# Patient Record
Sex: FEMALE | Race: ASIAN | NOT HISPANIC OR LATINO | Employment: PART TIME | URBAN - METROPOLITAN AREA
[De-identification: names, ages, dates, MRNs, and addresses within clinical notes are randomized per-mention and may not be internally consistent; named-entity substitution may affect disease eponyms.]

---

## 2017-03-03 ENCOUNTER — TELEPHONE (OUTPATIENT)
Dept: FAMILY MEDICINE | Facility: CLINIC | Age: 37
End: 2017-03-03

## 2017-03-03 DIAGNOSIS — E55.9 VITAMIN D DEFICIENCY: ICD-10-CM

## 2017-03-03 RX ORDER — ERGOCALCIFEROL 1.25 MG/1
50000 CAPSULE, LIQUID FILLED ORAL
Qty: 8 CAPSULE | Refills: 0 | OUTPATIENT
Start: 2017-03-03

## 2017-03-03 NOTE — TELEPHONE ENCOUNTER
cholecalciferol (VITAMIN D3) 16826 UNITS capsule      Last Written Prescription Date: 12/22/16  Last Fill Quantity: 8,  # refills: 0   Last Office Visit with [facility], [facility] or [facility] prescribing provider: 12/14/16 Ginny Helm PA-C, [facility]

## 2017-03-03 NOTE — TELEPHONE ENCOUNTER
Should discontinue 42857 units once a week and take 2000 units daily as prescribed.  Recheck vitamin D level in 3 months

## 2017-03-03 NOTE — TELEPHONE ENCOUNTER
Routing refill request to provider for review/approval because:  Drug not on the [facility] refill protocol   Danitza Oro RN

## 2017-03-22 DIAGNOSIS — E55.9 VITAMIN D DEFICIENCY: ICD-10-CM

## 2017-03-22 RX ORDER — CHOLECALCIFEROL (VITAMIN D3) 50 MCG
2000 TABLET ORAL DAILY
Qty: 100 TABLET | Refills: 3 | Status: CANCELLED | OUTPATIENT
Start: 2017-03-22

## 2017-03-22 NOTE — TELEPHONE ENCOUNTER
Cholecalciferol (VITAMIN D) 2000 UNITS tablet      Last Written Prescription Date: 12/18/16  Last Fill Quantity: 100 tablets,  # refills: 3   Last Office Visit with [facility], [facility] or [facility] prescribing provider: 12/14/16  Ginny Patino

## 2017-03-27 DIAGNOSIS — E55.9 VITAMIN D DEFICIENCY: ICD-10-CM

## 2017-03-27 RX ORDER — CHOLECALCIFEROL (VITAMIN D3) 50 MCG
2000 TABLET ORAL DAILY
Qty: 100 TABLET | Refills: 3 | Status: CANCELLED | OUTPATIENT
Start: 2017-03-27

## 2017-03-27 NOTE — TELEPHONE ENCOUNTER
Cholecalciferol (VITAMIN D) 2000 UNITS tablet    Last Written Prescription Date: 12/18/16  Last Fill Quantity: 100 tablets, # refills: 3  Last Office Visit with G, P or [facility] prescribing provider: 12/14/16 Ginny Helm         DEXA Scan:  No order of DX HIP/PELVIS/SPINE is found.     No order of DX HIP/PELVIS/SPINE W LAT FRACTION ANALYSIS is found.       No results found for: HELENA Patino

## 2017-03-31 NOTE — TELEPHONE ENCOUNTER
The following prescription was sent to [place], [place]    Cholecalciferol (VITAMIN D) 2000 UNITS tablet 100 tablet 3 12/18/2016  --   Sig: Take 2,000 Units by mouth daily   Class: E-Prescribe   Route: Oral   Order: [id number]   E-Prescribing Status: Receipt confirmed by pharmacy (12/18/2016 11:09 AM CST)     Request denied.  Too soon to fill. Patient should have refills available.    Danitza Oro RN

## 2017-09-06 ENCOUNTER — OFFICE VISIT (OUTPATIENT)
Dept: FAMILY MEDICINE | Facility: CLINIC | Age: 37
End: 2017-09-06
Payer: COMMERCIAL

## 2017-09-06 VITALS
HEART RATE: 68 BPM | BODY MASS INDEX: 24.55 KG/M2 | SYSTOLIC BLOOD PRESSURE: 98 MMHG | RESPIRATION RATE: 16 BRPM | TEMPERATURE: 98.4 F | OXYGEN SATURATION: 100 % | DIASTOLIC BLOOD PRESSURE: 60 MMHG | HEIGHT: 61 IN | WEIGHT: 130 LBS

## 2017-09-06 DIAGNOSIS — E55.9 VITAMIN D DEFICIENCY: ICD-10-CM

## 2017-09-06 DIAGNOSIS — D50.9 IRON DEFICIENCY ANEMIA, UNSPECIFIED IRON DEFICIENCY ANEMIA TYPE: ICD-10-CM

## 2017-09-06 DIAGNOSIS — E03.9 ACQUIRED HYPOTHYROIDISM: ICD-10-CM

## 2017-09-06 DIAGNOSIS — Z12.4 SCREENING FOR MALIGNANT NEOPLASM OF CERVIX: ICD-10-CM

## 2017-09-06 DIAGNOSIS — R53.83 FATIGUE, UNSPECIFIED TYPE: Primary | ICD-10-CM

## 2017-09-06 DIAGNOSIS — Z23 NEED FOR PROPHYLACTIC VACCINATION AND INOCULATION AGAINST INFLUENZA: ICD-10-CM

## 2017-09-06 LAB
BASOPHILS # BLD AUTO: 0 10E9/L (ref 0–0.2)
BASOPHILS NFR BLD AUTO: 0.2 %
DIFFERENTIAL METHOD BLD: ABNORMAL
EOSINOPHIL # BLD AUTO: 0.8 10E9/L (ref 0–0.7)
EOSINOPHIL NFR BLD AUTO: 9.5 %
ERYTHROCYTE [DISTWIDTH] IN BLOOD BY AUTOMATED COUNT: 12.9 % (ref 10–15)
HCT VFR BLD AUTO: 34.9 % (ref 35–47)
HGB BLD-MCNC: 11.4 G/DL (ref 11.7–15.7)
LYMPHOCYTES # BLD AUTO: 3.3 10E9/L (ref 0.8–5.3)
LYMPHOCYTES NFR BLD AUTO: 39.6 %
MCH RBC QN AUTO: 28.9 PG (ref 26.5–33)
MCHC RBC AUTO-ENTMCNC: 32.7 G/DL (ref 31.5–36.5)
MCV RBC AUTO: 88 FL (ref 78–100)
MONOCYTES # BLD AUTO: 0.6 10E9/L (ref 0–1.3)
MONOCYTES NFR BLD AUTO: 6.8 %
NEUTROPHILS # BLD AUTO: 3.7 10E9/L (ref 1.6–8.3)
NEUTROPHILS NFR BLD AUTO: 43.9 %
PLATELET # BLD AUTO: 281 10E9/L (ref 150–450)
RBC # BLD AUTO: 3.95 10E12/L (ref 3.8–5.2)
WBC # BLD AUTO: 8.4 10E9/L (ref 4–11)

## 2017-09-06 PROCEDURE — 85025 COMPLETE CBC W/AUTO DIFF WBC: CPT | Performed by: PHYSICIAN ASSISTANT

## 2017-09-06 PROCEDURE — 99214 OFFICE O/P EST MOD 30 MIN: CPT | Mod: 25 | Performed by: PHYSICIAN ASSISTANT

## 2017-09-06 PROCEDURE — 87624 HPV HI-RISK TYP POOLED RSLT: CPT | Performed by: PHYSICIAN ASSISTANT

## 2017-09-06 PROCEDURE — 82728 ASSAY OF FERRITIN: CPT | Performed by: PHYSICIAN ASSISTANT

## 2017-09-06 PROCEDURE — G0145 SCR C/V CYTO,THINLAYER,RESCR: HCPCS | Performed by: PHYSICIAN ASSISTANT

## 2017-09-06 PROCEDURE — 84443 ASSAY THYROID STIM HORMONE: CPT | Performed by: PHYSICIAN ASSISTANT

## 2017-09-06 PROCEDURE — 36415 COLL VENOUS BLD VENIPUNCTURE: CPT | Performed by: PHYSICIAN ASSISTANT

## 2017-09-06 PROCEDURE — 90686 IIV4 VACC NO PRSV 0.5 ML IM: CPT | Performed by: PHYSICIAN ASSISTANT

## 2017-09-06 PROCEDURE — 82306 VITAMIN D 25 HYDROXY: CPT | Performed by: PHYSICIAN ASSISTANT

## 2017-09-06 PROCEDURE — 90471 IMMUNIZATION ADMIN: CPT | Performed by: PHYSICIAN ASSISTANT

## 2017-09-06 RX ORDER — LEVOTHYROXINE SODIUM 100 UG/1
100 TABLET ORAL DAILY
Qty: 90 TABLET | Refills: 3 | Status: SHIPPED | OUTPATIENT
Start: 2017-09-06 | End: 2018-09-19

## 2017-09-06 ASSESSMENT — PAIN SCALES - GENERAL: PAINLEVEL: NO PAIN (0)

## 2017-09-06 NOTE — MR AVS SNAPSHOT
After Visit Summary   9/6/2017    Chelsey Bolanos    MRN: [id number]           Patient Information     Date Of Birth          1980        Visit Information        Provider Department      9/6/2017 6:00 PM Ginny Helm PA-C [facility]        Today's Diagnoses     Screening for malignant neoplasm of cervix    -  1    Acquired hypothyroidism        Fatigue, unspecified type        Iron deficiency anemia, unspecified iron deficiency anemia type        Vitamin D deficiency          Care Instructions    We will notify you of lab results via [employer] [facility]  Follow up with us if dry skin does not improve.   I have sent over refill of your thyroid medication for one year - I will make adjustments in medications if needed.           Follow-ups after your visit        Who to contact     If you have questions or need follow up information about today's clinic visit or your schedule please contact [facility] directly at [phone].  Normal or non-critical lab and imaging results will be communicated to you by MyChart, letter or phone within 4 business days after the clinic has received the results. If you do not hear from us within 7 days, please contact the clinic through [facility] or phone. If you have a critical or abnormal lab result, we will notify you by phone as soon as possible.  Submit refill requests through [employer] or call your pharmacy and they will forward the refill request to us. Please allow 3 business days for your refill to be completed.          Additional Information About Your Visit        MyChart Information     [facility] gives you secure access to your electronic health record. If you see a primary care provider, you can also send messages to your care team and make appointments. If you have questions, please call your primary care clinic.  If you do not have a primary care provider, please call [phone] and they will assist you.        Care EveryWhere  "ID     This is your Care EveryWhere ID. This could be used by other organizations to access your [place] medical records  [id number]        Your Vitals Were     Pulse Temperature Respirations Height Last Period Pulse Oximetry    68 98.4  F (36.9  C) (Oral) 16 1.556 m (5' 1.25\") 08/10/2017 (Exact Date) 100%    Breastfeeding? BMI (Body Mass Index)                No 24.36 kg/m2           Blood Pressure from Last 3 Encounters:   09/06/17 98/60   12/14/16 98/80   03/18/15 101/68    Weight from Last 3 Encounters:   09/06/17 59 kg (130 lb)   12/14/16 56.9 kg (125 lb 6.4 oz)   03/18/15 62.1 kg (137 lb)              We Performed the Following     25 Hydroxyvitamin D2 and D3     CBC with platelets differential     Ferritin     HPV High Risk Types DNA Cervical     Pap imaged thin layer screen with HPV - recommended age 30 - 65 years (select HPV order below)     TSH with free T4 reflex          Where to get your medicines      These medications were sent to [id number] - [place], [place] - [address]  [address], [facility] [address]     Phone:  [phone]     levothyroxine 100 MCG tablet          Primary Care Provider Office Phone # Fax #    Ginny Helm PA-C [phone] [address]  [facility] [address]        Equal Access to Services     BHARAT DEL CID : Hadii adarsh ku hadasho Soomaali, waaxda luqadaha, qaybta kaalmada adeegyada, caren perez. So [facility] [phone].    ATENCIÓN: Si habla español, tiene a allred disposición servicios gratuitos de asistencia lingüística. Llame al [phone].    We comply with applicable federal civil rights laws and [place] laws. We do not discriminate on the basis of race, color, national origin, age, disability sex, sexual orientation or gender identity.            Thank you!     Thank you for choosing [facility]  for your care. Our goal is always to provide you with excellent " care. Hearing back from our patients is one way we can continue to improve our services. Please take a few minutes to complete the written survey that you may receive in the mail after your visit with us. Thank you!             Your Updated Medication List - Protect others around you: Learn how to safely use, store and throw away your medicines at www.disposemymeds.org.          This list is accurate as of: 9/6/17  6:34 PM.  Always use your most recent med list.                   Brand Name Dispense Instructions for use Diagnosis    ferrous gluconate 324 (38 FE) MG tablet    FERGON    180 tablet    Take 1 tablet (324 mg) by mouth 2 times daily    Other iron deficiency anemia       levothyroxine 100 MCG tablet    SYNTHROID/LEVOTHROID    90 tablet    Take 1 tablet (100 mcg) by mouth daily    Acquired hypothyroidism       vitamin D 2000 UNITS tablet     100 tablet    Take 2,000 Units by mouth daily    Vitamin D deficiency

## 2017-09-06 NOTE — PROGRESS NOTES
SUBJECTIVE:   Chelsey Bolanos is a 36 year old female who presents to clinic today for the following health issues:      Hypothyroidism Follow-up      Since last visit, patient describes the following symptoms: fatigue, skin changes    Amount of exercise or physical activity: 5 days a week    Problems taking medications regularly: No    Medication side effects: none    Diet: regular (no restrictions)      Complains of fatigue.  Feels like has to nap or sleep every day.  Sleeps well.  No chest pain or shortness of breath.  No weight loss.   Wt Readings from Last 4 Encounters:   09/06/17 59 kg (130 lb)   12/14/16 56.9 kg (125 lb 6.4 oz)   03/18/15 62.1 kg (137 lb)   11/24/14 58.1 kg (128 lb)         Area of dry skin dorsum of right hand.  If out of the sun seems to blister up and become drier.  No redness.       Problem list and histories reviewed & adjusted, as indicated.  Additional history: as documented    Patient Active Problem List   Diagnosis     CARDIOVASCULAR SCREENING; LDL GOAL LESS THAN 160     Hypothyroidism     Anemia     Acquired hypothyroidism     Iron deficiency anemia, unspecified iron deficiency anemia type     Vitamin D deficiency     Past Surgical History:   Procedure Laterality Date     GYN SURGERY      C section x 2       Social History   Substance Use Topics     Smoking status: Never Smoker     Smokeless tobacco: Never Used     Alcohol use No     Family History   Problem Relation Age of Onset     Breast Cancer Mother 50     DIABETES Mother      C.A.D. Maternal Grandmother      Coronary Artery Disease Other 54     MI and stent placement     Colon Cancer No family hx of          Current Outpatient Prescriptions   Medication Sig Dispense Refill     levothyroxine (SYNTHROID/LEVOTHROID) 100 MCG tablet Take 1 tablet (100 mcg) by mouth daily 90 tablet 3     Cholecalciferol (VITAMIN D) 2000 UNITS tablet Take 2,000 Units by mouth daily 100 tablet 3     ferrous gluconate (FERGON) 324 (38 FE) MG tablet  "Take 1 tablet (324 mg) by mouth 2 times daily 180 tablet 3         Reviewed and updated as needed this visit by clinical staffTobacco  Allergies  Meds  Med Hx  Surg Hx  Fam Hx  Soc Hx      Reviewed and updated as needed this visit by Provider         ROS:  Constitutional, HEENT, cardiovascular, pulmonary, gi and gu systems are negative, except as otherwise noted.      OBJECTIVE:   BP 98/60 (BP Location: Right arm, Patient Position: Chair, Cuff Size: Adult Regular)  Pulse 68  Temp 98.4  F (36.9  C) (Oral)  Resp 16  Ht 1.556 m (5' 1.25\")  Wt 59 kg (130 lb)  LMP 08/10/2017 (Exact Date)  SpO2 100%  Breastfeeding? No  BMI 24.36 kg/m2  Body mass index is 24.36 kg/(m^2).  GENERAL: healthy, alert and no distress  NECK: no adenopathy, no asymmetry, masses, or scars and thyroid normal to palpation  RESP: lungs clear to auscultation - no rales, rhonchi or wheezes  CV: regular rate and rhythm, normal S1 S2, no S3 or S4, no murmur, click or rub, no peripheral edema and peripheral pulses strong  ABDOMEN: soft, nontender, no hepatosplenomegaly, no masses and bowel sounds normal   (female): normal female external genitalia, normal urethral meatus, vaginal mucosa, normal cervix/adnexa/uterus without masses or discharge  MS: no gross musculoskeletal defects noted, no edema  SKIN: no suspicious lesions or rashes    Diagnostic Test Results:  Results for orders placed or performed in visit on 09/06/17   CBC with platelets differential   Result Value Ref Range    WBC 8.4 4.0 - 11.0 10e9/L    RBC Count 3.95 3.8 - 5.2 10e12/L    Hemoglobin 11.4 (L) 11.7 - 15.7 g/dL    Hematocrit 34.9 (L) 35.0 - 47.0 %    MCV 88 78 - 100 fl    MCH 28.9 26.5 - 33.0 pg    MCHC 32.7 31.5 - 36.5 g/dL    RDW 12.9 10.0 - 15.0 %    Platelet Count 281 150 - 450 10e9/L    Diff Method Automated Method     % Neutrophils 43.9 %    % Lymphocytes 39.6 %    % Monocytes 6.8 %    % Eosinophils 9.5 %    % Basophils 0.2 %    Absolute Neutrophil 3.7 1.6 - 8.3 " 10e9/L    Absolute Lymphocytes 3.3 0.8 - 5.3 10e9/L    Absolute Monocytes 0.6 0.0 - 1.3 10e9/L    Absolute Eosinophils 0.8 (H) 0.0 - 0.7 10e9/L    Absolute Basophils 0.0 0.0 - 0.2 10e9/L       ASSESSMENT/PLAN:             1. Fatigue, unspecified type  Patient did not wait for labs.  hgb is low. Will wait for ferritin to come back before recommendations.  Has been iron deficient and low vitamin D in past.  Also history of hypothyroidism- will wait for tsh   - TSH with free T4 reflex  - CBC with platelets differential  - Ferritin    2. Acquired hypothyroidism  Will recheck TSH given fatigue  - levothyroxine (SYNTHROID/LEVOTHROID) 100 MCG tablet; Take 1 tablet (100 mcg) by mouth daily  Dispense: 90 tablet; Refill: 3  - TSH with free T4 reflex    3. Screening for malignant neoplasm of cervix    - Pap imaged thin layer screen with HPV - recommended age 30 - 65 years (select HPV order below)  - HPV High Risk Types DNA Cervical    4. Iron deficiency anemia, unspecified iron deficiency anemia type    - Ferritin    5. Vitamin D deficiency  Is taking vitamin D   - 25 Hydroxyvitamin D2 and D3    6. Need for prophylactic vaccination and inoculation against influenza    - FLU VAC, SPLIT VIRUS IM > 3 YO (QUADRIVALENT) [50587]  - Vaccine Administration, Initial [62415]    Patient Instructions   We will notify you of lab results via MyChart  Follow up with us if dry skin does not improve.   I have sent over refill of your thyroid medication for one year - I will make adjustments in medications if needed.        Ginny Helm PA-C  [facility]        Injectable Influenza Immunization Documentation    1.  Is the person to be vaccinated sick today?  No    2. Does the person to be vaccinated have an allergy to eggs or to a component of the vaccine?  No    3. Has the person to be vaccinated today ever had a serious reaction to influenza vaccine in the past?  No    4. Has the person to be vaccinated ever had  Guillain-[place] syndrome?  No     Form completed by   Gertrudis Chavez MA

## 2017-09-06 NOTE — PATIENT INSTRUCTIONS
We will notify you of lab results via [facility]  Follow up with us if dry skin does not improve.   I have sent over refill of your thyroid medication for one year - I will make adjustments in medications if needed.

## 2017-09-06 NOTE — NURSING NOTE
"Chief Complaint   Patient presents with     Thyroid Disease       Initial BP 98/60 (BP Location: Right arm, Patient Position: Chair, Cuff Size: Adult Regular)  Pulse 68  Temp 98.4  F (36.9  C) (Oral)  Resp 16  Ht 1.556 m (5' 1.25\")  Wt 59 kg (130 lb)  LMP 08/10/2017 (Exact Date)  SpO2 100%  Breastfeeding? No  BMI 24.36 kg/m2 Estimated body mass index is 24.36 kg/(m^2) as calculated from the following:    Height as of this encounter: 1.556 m (5' 1.25\").    Weight as of this encounter: 59 kg (130 lb).  Medication Reconciliation: complete     Gertrudis Chavez MA       "

## 2017-09-07 ENCOUNTER — TELEPHONE (OUTPATIENT)
Dept: FAMILY MEDICINE | Facility: CLINIC | Age: 37
End: 2017-09-07

## 2017-09-07 LAB
FERRITIN SERPL-MCNC: 20 NG/ML (ref 12–150)
TSH SERPL DL<=0.005 MIU/L-ACNC: 1.05 MU/L (ref 0.4–4)

## 2017-09-07 NOTE — TELEPHONE ENCOUNTER
Notes Recorded by Ginny Helm PA-C on 9/7/2017 at 1:47 PM  Please call and advise - didn't have [facility] access yesterday  Ferritin levels were normal.   Thyroid function was normal   hgb was low and could explain her fatigue- is she taking iron regularly? If not restart and recheck hgb in one month.  If she is taking iron regularly route back to me so we can add some additional labs    Called patient and advised of above. Not taking iron supplement regularly as she keeps forgetting. Patient aware to restart iron and to recheck Hgb in a month. Will call to schedule lab appointment.\  Danitza Tran RN.

## 2017-09-07 NOTE — PROGRESS NOTES
Please call and advise - didn't have [employer] access yesterday  Ferritin levels were normal.   Thyroid function was normal   hgb was low and could explain her fatigue- is she taking iron regularly? If not restart and recheck hgb in one month.  If she is taking iron regularly route back to me so we can add some additional labs

## 2017-09-08 ENCOUNTER — TELEPHONE (OUTPATIENT)
Dept: FAMILY MEDICINE | Facility: CLINIC | Age: 37
End: 2017-09-08

## 2017-09-08 DIAGNOSIS — D50.9 IRON DEFICIENCY ANEMIA, UNSPECIFIED IRON DEFICIENCY ANEMIA TYPE: Primary | ICD-10-CM

## 2017-09-08 DIAGNOSIS — E55.9 VITAMIN D DEFICIENCY: ICD-10-CM

## 2017-09-08 LAB
COPATH REPORT: NORMAL
DEPRECATED CALCIDIOL+CALCIFEROL SERPL-MC: <29 UG/L (ref 20–75)
PAP: NORMAL
VITAMIN D2 SERPL-MCNC: <5 UG/L
VITAMIN D3 SERPL-MCNC: 24 UG/L

## 2017-09-08 RX ORDER — CHOLECALCIFEROL (VITAMIN D3) 50 MCG
2000 TABLET ORAL DAILY
Qty: 100 TABLET | Refills: 3 | Status: SHIPPED | OUTPATIENT
Start: 2017-09-08 | End: 2017-10-12

## 2017-09-08 NOTE — TELEPHONE ENCOUNTER
Vitamin D 2,000 IU sent to [email or web address]. Called and advised patient of below and that RX is at [email or web address].  Danitza Tran RN.     Patient will call back to schedule both lab appointment.  Danitza Tran RN.

## 2017-09-08 NOTE — TELEPHONE ENCOUNTER
Continue 2000 units daily.    Repeat labs for iron deficiency anemia in one month.  Labs for vitamin D deficiency in 3 months

## 2017-09-08 NOTE — TELEPHONE ENCOUNTER
Notes Recorded by Radha Montana, LIANA on 9/8/2017 at 1:32 PM  Please call: labs show anemia: NP sent in script for iron 325 mg daily-start today. Recommend return to clinic for a visit on Monday/Tuesday next week to see how he is feeling and repeat labs. Call if develop fever/chills or worsening SOB.   BELÉN Grossman, NP-C    Patient was informed of test results and recommendations from provider. Patient verbalized understanding of all information given. Patient has not been taking her Vitamin D. Was sent to [facility] in Dec. Will notify PCP if wants same dose of Vitamin D as in Dec.  Routing to provider to advise on dose.  Danitza Tran RN.

## 2017-09-08 NOTE — PROGRESS NOTES
Please call and advise - did not have [facility] access the other day  Vitamin D level was low.  Please make sure taking vitamin D daily and recheck vitamin D level in 3 months.

## 2017-09-11 LAB
FINAL DIAGNOSIS: NORMAL
HPV HR 12 DNA CVX QL NAA+PROBE: NEGATIVE
HPV16 DNA SPEC QL NAA+PROBE: NEGATIVE
HPV18 DNA SPEC QL NAA+PROBE: NEGATIVE
SPECIMEN DESCRIPTION: NORMAL

## 2017-10-12 DIAGNOSIS — E55.9 VITAMIN D DEFICIENCY: ICD-10-CM

## 2017-10-12 RX ORDER — CHOLECALCIFEROL (VITAMIN D3) 50 MCG
2000 TABLET ORAL DAILY
Qty: 100 TABLET | Refills: 3 | Status: SHIPPED | OUTPATIENT
Start: 2017-10-12

## 2017-10-12 NOTE — TELEPHONE ENCOUNTER
Reason for Call:  Medication or medication refill:    Do you use a [facility]?  Name of the pharmacy and phone number for the current request: [facility]    Name of the medication requested: Vit D 2000 mg wants kind of tab without gelatin in it     Other request: please call and advise    Can we leave a detailed message on this number? YES    Phone number patient can be reached at: Home number on file [phone] (home)    Best Time: any    Call taken on 10/12/2017 at 12:24 PM by Nazanin Richardson

## 2017-10-12 NOTE — TELEPHONE ENCOUNTER
Cholecalciferol (VITAMIN D) 2000 UNITS tablet      Last Written Prescription Date: 9/8/17  Last Fill Quantity: 100,  # refills: 3   Last Office Visit with [facility], [facility] or [facility] prescribing provider: 9/6/17

## 2018-03-08 ENCOUNTER — TELEPHONE (OUTPATIENT)
Dept: FAMILY MEDICINE | Facility: CLINIC | Age: 38
End: 2018-03-08

## 2018-03-08 NOTE — TELEPHONE ENCOUNTER
PA for Levothyroxine Sodium    Www[employer]covermymeds.[employer]    Key: [id number]    Patients last name    Patients ESTUARDO Patino

## 2018-03-14 NOTE — TELEPHONE ENCOUNTER
PA NOT NEEDED INSURANCE STATES THAT IF PATIENT NEEDS A 90 DAY SUPPLY THEY NEED TO GO TO A MAIL ORDER PHARMACY. I TALK TO THE PHARMACIST AT [facility]. THEY PROCESSED IT FOR A 30 DAY SUPPLY WITH A  $9.15 COPAY.

## 2018-03-14 NOTE — TELEPHONE ENCOUNTER
Central Prior Authorization Team   Phone: [phone]      PA Initiation    Medication: PA for Levothyroxine Sodium  Insurance Company: [employer] - Phone [phone] Fax [phone]  Pharmacy Filling the Rx: [id number] - [place], [place] - [address]  Filling Pharmacy Phone: [phone]  Filling Pharmacy Fax: [phone]  Start Date: 3/14/2018

## 2018-09-18 ENCOUNTER — DOCUMENTATION ONLY (OUTPATIENT)
Dept: LAB | Facility: CLINIC | Age: 38
End: 2018-09-18

## 2018-09-18 DIAGNOSIS — Z00.00 ROUTINE GENERAL MEDICAL EXAMINATION AT A HEALTH CARE FACILITY: ICD-10-CM

## 2018-09-18 DIAGNOSIS — D50.9 IRON DEFICIENCY ANEMIA, UNSPECIFIED IRON DEFICIENCY ANEMIA TYPE: ICD-10-CM

## 2018-09-18 DIAGNOSIS — E03.9 ACQUIRED HYPOTHYROIDISM: ICD-10-CM

## 2018-09-18 DIAGNOSIS — E55.9 VITAMIN D DEFICIENCY: ICD-10-CM

## 2018-09-18 DIAGNOSIS — Z00.00 ROUTINE GENERAL MEDICAL EXAMINATION AT A HEALTH CARE FACILITY: Primary | ICD-10-CM

## 2018-09-18 PROCEDURE — 82728 ASSAY OF FERRITIN: CPT | Performed by: FAMILY MEDICINE

## 2018-09-18 PROCEDURE — 82306 VITAMIN D 25 HYDROXY: CPT | Performed by: FAMILY MEDICINE

## 2018-09-18 PROCEDURE — 36415 COLL VENOUS BLD VENIPUNCTURE: CPT | Performed by: FAMILY MEDICINE

## 2018-09-18 PROCEDURE — 80053 COMPREHEN METABOLIC PANEL: CPT | Performed by: FAMILY MEDICINE

## 2018-09-18 PROCEDURE — 85027 COMPLETE CBC AUTOMATED: CPT | Performed by: FAMILY MEDICINE

## 2018-09-18 NOTE — PROGRESS NOTES
Patient is coming in for pre visit labs. The only orders in her chart are from mikael and seem old, so I thought to check and make sure those the the ones needed? If not needed, please place correct orders. Thanks.

## 2018-09-19 ENCOUNTER — TELEPHONE (OUTPATIENT)
Dept: FAMILY MEDICINE | Facility: CLINIC | Age: 38
End: 2018-09-19

## 2018-09-19 DIAGNOSIS — E03.9 ACQUIRED HYPOTHYROIDISM: ICD-10-CM

## 2018-09-19 NOTE — TELEPHONE ENCOUNTER
"Requested Prescriptions   Pending Prescriptions Disp Refills     levothyroxine (SYNTHROID/LEVOTHROID) 100 MCG tablet 90 tablet 3     Sig: Take 1 tablet (100 mcg) by mouth daily    Thyroid Protocol Failed    9/19/2018 10:59 AM       Failed - Normal TSH on file in past 12 months    Recent Labs   Lab Test  09/06/17   1826   TSH  1.05             Passed - Patient is 12 years or older       Passed - Recent (12 mo) or future (30 days) visit within the authorizing provider's specialty    Patient had office visit in the last 12 months or has a visit in the next 30 days with authorizing provider or within the authorizing provider's specialty.  See \"Patient Info\" tab in inbasket, or \"Choose Columns\" in Meds & Orders section of the refill encounter.           Passed - No active pregnancy on record    If patient is pregnant or has had a positive pregnancy test, please check TSH.         Passed - No positive pregnancy test in past 12 months    If patient is pregnant or has had a positive pregnancy test, please check TSH.        levothyroxine (SYNTHROID/LEVOTHROID) 100 MCG tablet  Last Written Prescription Date:  9/6/17  Last Fill Quantity: 90,  # refills: 3   Last office visit: 9/6/2017 with prescribing provider:  Ginny Helm   Future Office Visit:   Next 5 appointments (look out 90 days)     Sep 27, 2018  3:20 PM CDT   [facility] Pen[facility] Cortes [facility]   [facility]    [address] [id number] [phone]                   "

## 2018-09-20 DIAGNOSIS — E03.9 ACQUIRED HYPOTHYROIDISM: Primary | ICD-10-CM

## 2018-09-20 LAB
ALBUMIN SERPL-MCNC: 3.6 G/DL (ref 3.4–5)
ALP SERPL-CCNC: 80 U/L (ref 40–150)
ALT SERPL W P-5'-P-CCNC: 18 U/L (ref 0–50)
ANION GAP SERPL CALCULATED.3IONS-SCNC: 9 MMOL/L (ref 3–14)
AST SERPL W P-5'-P-CCNC: 15 U/L (ref 0–45)
BILIRUB SERPL-MCNC: 0.4 MG/DL (ref 0.2–1.3)
BUN SERPL-MCNC: 12 MG/DL (ref 7–30)
CALCIUM SERPL-MCNC: 8.7 MG/DL (ref 8.5–10.1)
CHLORIDE SERPL-SCNC: 107 MMOL/L (ref 94–109)
CO2 SERPL-SCNC: 26 MMOL/L (ref 20–32)
CREAT SERPL-MCNC: 0.56 MG/DL (ref 0.52–1.04)
ERYTHROCYTE [DISTWIDTH] IN BLOOD BY AUTOMATED COUNT: 12.4 % (ref 10–15)
FERRITIN SERPL-MCNC: 23 NG/ML (ref 12–150)
GFR SERPL CREATININE-BSD FRML MDRD: >90 ML/MIN/1.7M2
GLUCOSE SERPL-MCNC: 99 MG/DL (ref 70–99)
HCT VFR BLD AUTO: 34.6 % (ref 35–47)
HGB BLD-MCNC: 11.5 G/DL (ref 11.7–15.7)
MCH RBC QN AUTO: 30.3 PG (ref 26.5–33)
MCHC RBC AUTO-ENTMCNC: 33.2 G/DL (ref 31.5–36.5)
MCV RBC AUTO: 91 FL (ref 78–100)
PLATELET # BLD AUTO: 269 10E9/L (ref 150–450)
POTASSIUM SERPL-SCNC: 3.9 MMOL/L (ref 3.4–5.3)
PROT SERPL-MCNC: 7.2 G/DL (ref 6.8–8.8)
RBC # BLD AUTO: 3.79 10E12/L (ref 3.8–5.2)
SODIUM SERPL-SCNC: 142 MMOL/L (ref 133–144)
WBC # BLD AUTO: 7.2 10E9/L (ref 4–11)

## 2018-09-20 PROCEDURE — 84443 ASSAY THYROID STIM HORMONE: CPT | Performed by: PHYSICIAN ASSISTANT

## 2018-09-20 PROCEDURE — 36415 COLL VENOUS BLD VENIPUNCTURE: CPT | Performed by: PHYSICIAN ASSISTANT

## 2018-09-20 RX ORDER — LEVOTHYROXINE SODIUM 100 UG/1
100 TABLET ORAL DAILY
Qty: 30 TABLET | Refills: 0 | Status: SHIPPED | OUTPATIENT
Start: 2018-09-20 | End: 2018-09-27

## 2018-09-20 NOTE — PROGRESS NOTES
Luis Barbosa  Your ferritin (or long term iron stores) was normal but your hemoglobin or short term iron store was low.   Your electrolytes, blood sugar, kidney function and liver function were normal.  Your vitamin D level and thyroid test are not back yet.   I see that you have an appointment coming up with Dr. Cortes next week.   Please review your results with her.  Please call or MyChart my office with any questions or concerns.    Ginny Helm, PAC

## 2018-09-20 NOTE — TELEPHONE ENCOUNTER
Please contact patient and assist with scheduling appointment for physical and fasting labs.   Given one month

## 2018-09-20 NOTE — TELEPHONE ENCOUNTER
Routing refill request to provider for review/approval because:  Patient needs to be seen because it has been more than 1 year since last office visit.  Dian Nathan RN

## 2018-09-21 LAB
DEPRECATED CALCIDIOL+CALCIFEROL SERPL-MC: 29 UG/L (ref 20–75)
TSH SERPL DL<=0.005 MIU/L-ACNC: 1.94 MU/L (ref 0.4–4)

## 2018-09-21 NOTE — PROGRESS NOTES
Patient has upcoming appointment with you Dr. Sebastian Barbosa  Your thyroid function is the normal range.  Please review your labs and prescription refills with Dr. Cortes at your appointment.  Please call or MyChart my office with any questions or concerns.    Ginny Helm, PAC

## 2018-09-21 NOTE — PROGRESS NOTES
Luis Barbosa  Your vitamin D level was normal.   Please call or MyChart my office with any questions or concerns.    Ginny Helm, PAC

## 2018-09-24 LAB
DEPRECATED CALCIDIOL+CALCIFEROL SERPL-MC: <39 UG/L (ref 20–75)
VITAMIN D2 SERPL-MCNC: <5 UG/L
VITAMIN D3 SERPL-MCNC: 34 UG/L

## 2018-09-27 ENCOUNTER — OFFICE VISIT (OUTPATIENT)
Dept: FAMILY MEDICINE | Facility: CLINIC | Age: 38
End: 2018-09-27
Payer: COMMERCIAL

## 2018-09-27 VITALS
DIASTOLIC BLOOD PRESSURE: 38 MMHG | HEART RATE: 72 BPM | SYSTOLIC BLOOD PRESSURE: 102 MMHG | HEIGHT: 61 IN | OXYGEN SATURATION: 98 % | BODY MASS INDEX: 23.81 KG/M2 | RESPIRATION RATE: 16 BRPM | WEIGHT: 126.1 LBS | TEMPERATURE: 98.2 F

## 2018-09-27 DIAGNOSIS — Z23 NEED FOR PROPHYLACTIC VACCINATION AND INOCULATION AGAINST INFLUENZA: ICD-10-CM

## 2018-09-27 DIAGNOSIS — N64.89 ASYMMETRICAL BREASTS: ICD-10-CM

## 2018-09-27 DIAGNOSIS — E03.9 ACQUIRED HYPOTHYROIDISM: Primary | ICD-10-CM

## 2018-09-27 DIAGNOSIS — N64.4 MASTALGIA: ICD-10-CM

## 2018-09-27 PROCEDURE — 99214 OFFICE O/P EST MOD 30 MIN: CPT | Mod: 25 | Performed by: FAMILY MEDICINE

## 2018-09-27 PROCEDURE — 90686 IIV4 VACC NO PRSV 0.5 ML IM: CPT | Performed by: FAMILY MEDICINE

## 2018-09-27 PROCEDURE — 90471 IMMUNIZATION ADMIN: CPT | Performed by: FAMILY MEDICINE

## 2018-09-27 ASSESSMENT — PAIN SCALES - GENERAL: PAINLEVEL: NO PAIN (0)

## 2018-09-27 NOTE — MR AVS SNAPSHOT
After Visit Summary   9/27/2018    Chelsey Bolanos    MRN: [id number]           Patient Information     Date Of Birth          1980        Visit Information        Provider Department      9/27/2018 3:20 PM Penelope Cortes MD [facility]        Today's Diagnoses     Acquired hypothyroidism    -  1    Need for prophylactic vaccination and inoculation against influenza        Mastalgia        Asymmetrical breasts          Care Instructions    Flu shot today.    I would recommend referral to plastic surgery to evaluate the breast due to size asymmetry      Continue vitamin D    Refills of Synthroid for year sent to pharmacy.          Follow-ups after your visit        Additional Services     PLASTIC SURGERY REFERRAL       Your provider has referred you to: [facility]: [facility] [phone] [email or web address]    Please be aware that coverage of these services is subject to the terms and limitations of your health insurance plan.  Call member services at your health plan with any benefit or coverage questions.      Please bring the following with you to your appointment:    (1) Any X-Rays, CTs or MRIs which have been performed.  Contact the facility where they were done to arrange for  prior to your scheduled appointment.    (2) List of current medications  (3) This referral request   (4) Any documents/labs given to you for this referral                  Who to contact     If you have questions or need follow up information about today's clinic visit or your schedule please contact [facility] directly at [phone].  Normal or non-critical lab and imaging results will be communicated to you by MyChart, letter or phone within 4 business days after the clinic has received the results. If you do not hear from us within 7 days, please  "contact the clinic through [employer] or phone. If you have a critical or abnormal lab result, we will notify you by phone as soon as possible.  Submit refill requests through [employer] or call your pharmacy and they will forward the refill request to us. Please allow 3 business days for your refill to be completed.          Additional Information About Your Visit        [employer] Information     [employer] gives you secure access to your electronic health record. If you see a primary care provider, you can also send messages to your care team and make appointments. If you have questions, please call your primary care clinic.  If you do not have a primary care provider, please call [phone] and they will assist you.        Care EveryWhere ID     This is your Care EveryWhere ID. This could be used by other organizations to access your [facility] medical records  [id number]        Your Vitals Were     Pulse Temperature Respirations Height Last Period Pulse Oximetry    72 98.2  F (36.8  C) (Oral) 16 1.549 m (5' 1\") 09/21/2018 98%    Breastfeeding? BMI (Body Mass Index)                No 23.83 kg/m2           Blood Pressure from Last 3 Encounters:   09/27/18 (!) 102/38   09/06/17 98/60   12/14/16 98/80    Weight from Last 3 Encounters:   09/27/18 57.2 kg (126 lb 1.6 oz)   09/06/17 59 kg (130 lb)   12/14/16 56.9 kg (125 lb 6.4 oz)              We Performed the Following     FLU VACCINE, SPLIT VIRUS, IM (QUADRIVALENT) [53902]- >3 YRS     PLASTIC SURGERY REFERRAL     Vaccine Administration, Initial [39752]        Primary Care Provider Office Phone # Fax #    Ginny Helm PA-C [phone] [address]  [facility] [address]        Equal Access to Services     MAXINE DEL CID : Enoc Nciole, francisco javier shukla, brendan krishnanalcaren briseno. So [facility] [phone].    ATENCIÓN: Si habla español, tiene a allred disposición servicios gratuitos de asistencia " lingüística. [place] al [phone].    We comply with applicable federal civil rights laws and [place] laws. We do not discriminate on the basis of race, color, national origin, age, disability, sex, sexual orientation, or gender identity.            Thank you!     Thank you for choosing [facility]  for your care. Our goal is always to provide you with excellent care. Hearing back from our patients is one way we can continue to improve our services. Please take a few minutes to complete the written survey that you may receive in the mail after your visit with us. Thank you!             Your Updated Medication List - Protect others around you: Learn how to safely use, store and throw away your medicines at www.disposemymeds.org.          This list is accurate as of 9/27/18  4:46 PM.  Always use your most recent med list.                   Brand Name Dispense Instructions for use Diagnosis    ferrous gluconate 324 (38 Fe) MG tablet    FERGON    180 tablet    Take 1 tablet (324 mg) by mouth 2 times daily    Other iron deficiency anemia       levothyroxine 100 MCG tablet    SYNTHROID/LEVOTHROID    30 tablet    Take 1 tablet (100 mcg) by mouth daily Needs to be seen for any additional refills    Acquired hypothyroidism       vitamin D 2000 units tablet     100 tablet    Take 2,000 Units by mouth daily    Vitamin D deficiency

## 2018-09-27 NOTE — PROGRESS NOTES
SUBJECTIVE:   Chelsey Bolanos is a 37 year old female who presents to clinic today for the following health issues:      Hyperlipidemia Follow-Up      Rate your low fat/cholesterol diet?: good    Taking statin?  No    Other lipid medications/supplements?:  none    Hypothyroidism Follow-up    Since last visit, patient describes the following symptoms: Weight stable, no hair loss, no skin changes, no constipation, no loose stools, feel pain left neck  Reports menses heavy x 3 days but regular.        Amount of exercise or physical activity: 2-3 days/week for an average of greater than 60 minutes  - treadmill, yoga, cardio - tolerated well.        Problems taking medications regularly: No    Medication side effects: none    Diet: regular (no restrictions)        Mastalgia/asymetric breast left>right:  Reports has left anterior chest tenderness with pain in the breast and lateral and anterior chest wall.  Some mild pain out into the shoulder/arm.  Deep achy symptoms of discomfort.      Problem list and histories reviewed & adjusted, as indicated.  Additional history: as documented    Patient Active Problem List   Diagnosis     CARDIOVASCULAR SCREENING; LDL GOAL LESS THAN 160     Hypothyroidism     Anemia     Acquired hypothyroidism     Iron deficiency anemia, unspecified iron deficiency anemia type     Vitamin D deficiency     Past Surgical History:   Procedure Laterality Date     GYN SURGERY      C section x 2       Social History   Substance Use Topics     Smoking status: Never Smoker     Smokeless tobacco: Never Used     Alcohol use No     Family History   Problem Relation Age of Onset     Breast Cancer Mother 50     Diabetes Mother      C.A.D. Maternal Grandmother      Coronary Artery Disease Other 54     MI and stent placement     Colon Cancer No family hx of          Current Outpatient Prescriptions   Medication Sig Dispense Refill     Cholecalciferol (VITAMIN D) 2000 UNITS tablet Take 2,000 Units by mouth daily  "100 tablet 3     ferrous gluconate (FERGON) 324 (38 FE) MG tablet Take 1 tablet (324 mg) by mouth 2 times daily 180 tablet 3     levothyroxine (SYNTHROID/LEVOTHROID) 100 MCG tablet Take 1 tablet (100 mcg) by mouth daily Needs to be seen for any additional refills 30 tablet 0     No Known Allergies  Recent Labs   Lab Test  09/20/18   1730  09/18/18   1730  09/06/17   1826  12/08/16   0856   LDL   --    --    --   130*   HDL   --    --    --   44*   TRIG   --    --    --   82   ALT   --   18   --    --    CR   --   0.56   --    --    GFRESTIMATED   --   >90   --    --    GFRESTBLACK   --   >90   --    --    POTASSIUM   --   3.9   --    --    TSH  1.94   --   1.05  0.68      BP Readings from Last 3 Encounters:   09/27/18 (!) 102/38   09/06/17 98/60   12/14/16 98/80    Wt Readings from Last 3 Encounters:   09/27/18 57.2 kg (126 lb 1.6 oz)   09/06/17 59 kg (130 lb)   12/14/16 56.9 kg (125 lb 6.4 oz)                  Labs reviewed in EPIC    Reviewed and updated as needed this visit by clinical staff  Tobacco  Allergies  Meds  Med Hx  Surg Hx  Fam Hx  Soc Hx      Reviewed and updated as needed this visit by Provider  Tobacco  Allergies  Meds  Med Hx  Surg Hx  Fam Hx  Soc Hx        ROS:  Constitutional, HEENT, cardiovascular, pulmonary, gi and gu systems are negative, except as otherwise noted.    OBJECTIVE:     BP (!) 102/38 (BP Location: Left arm, Patient Position: Chair, Cuff Size: Adult Regular)  Pulse 72  Temp 98.2  F (36.8  C) (Oral)  Resp 16  Ht 1.549 m (5' 1\")  Wt 57.2 kg (126 lb 1.6 oz)  LMP 09/21/2018  SpO2 98%  Breastfeeding? No  BMI 23.83 kg/m2  Body mass index is 23.83 kg/(m^2).  GENERAL: alert, no distress and fatigued  HENT: ear canals and TM's normal, nose and mouth without ulcers or lesions  NECK: no adenopathy, no asymmetry, masses, or scars and thyroid normal to palpation  RESP: lungs clear to auscultation - no rales, rhonchi or wheezes  BREAST: no palpable axillary masses or " adenopathy, asymmetry left >right and diffuse tenderness left breast with discomfort over the left anterior chest wall beneath the breast which reproduces the pain in the chest with palpation.   CV: regular rate and rhythm, normal S1 S2, no S3 or S4, no murmur, click or rub, no peripheral edema and peripheral pulses strong  ABDOMEN: soft, nontender, no hepatosplenomegaly, no masses and bowel sounds normal  MS: FROM bilateral shoulders.  No tenderness to palpation neck or arm.  Chest wall tenderness as noted above.    SKIN: no suspicious lesions or rashes  NEURO: Normal strength and tone, mentation intact and speech normal  PSYCH: mentation appears normal, affect normal/bright    Diagnostic Test Results:  Results for orders placed or performed in visit on 09/20/18   TSH with free T4 reflex   Result Value Ref Range    TSH 1.94 0.40 - 4.00 mU/L       ASSESSMENT/PLAN:     Hypothyroidism; controlled/euthyroid   Plan:  No changes in the patient's current treatment plan      - levothyroxine (SYNTHROID/LEVOTHROID) 100 MCG tablet; Take 1 tablet (100 mcg) by mouth daily  Dispense: 90 tablet; Refill: 3    2. Mastalgia  3. Asymmetrical breasts  Chest wall discomfort likely contributed to or caused by the weight of the left breast, tenderness as noted.  Referral for evaluation.   - PLASTIC SURGERY REFERRAL    4. Need for prophylactic vaccination and inoculation against influenza  - FLU VACCINE, SPLIT VIRUS, IM (QUADRIVALENT) [78621]- >3 YRS  - Vaccine Administration, Initial [88480]    Patient Instructions   Flu shot today.    I would recommend referral to plastic surgery to evaluate the breast due to size asymmetry      Continue vitamin D    Refills of Synthroid for year sent to pharmacy.      Penelope Cortes MD  [facility]      Injectable Influenza Immunization Documentation    1.  Is the person to be vaccinated sick today?   No    2. Does the person to be vaccinated have an allergy to a component   of the  vaccine?   No  Egg Allergy Algorithm Link    3. Has the person to be vaccinated ever had a serious reaction   to influenza vaccine in the past?   No    4. Has the person to be vaccinated ever had Guillain-Barré syndrome?   No    Form completed by Triny Gonzáles MA    Prior to injection verified patient identity using patient's name and date of birth.  Due to injection administration, patient instructed to remain in clinic for 15 minutes  afterwards, and to report any adverse reaction to me immediately.

## 2018-09-27 NOTE — PATIENT INSTRUCTIONS
Flu shot today.    I would recommend referral to plastic surgery to evaluate the breast due to size asymmetry      Continue vitamin D    Refills of Synthroid for year sent to pharmacy.

## 2018-09-30 RX ORDER — LEVOTHYROXINE SODIUM 100 UG/1
100 TABLET ORAL DAILY
Qty: 90 TABLET | Refills: 3 | Status: SHIPPED | OUTPATIENT
Start: 2018-09-30 | End: 2019-10-14

## 2018-12-19 ENCOUNTER — TELEPHONE (OUTPATIENT)
Dept: DERMATOLOGY | Facility: CLINIC | Age: 38
End: 2018-12-19

## 2018-12-19 NOTE — TELEPHONE ENCOUNTER
PREVISIT INFORMATION                                                    Chelsey Luis Miguel scheduled for future visit at [facility] specialty clinics.    Patient is scheduled to see   on 12/21/18 at 4:30pm  Reason for visit: Consult for Asymmetrical breasts   Referring provider Dr. Cortes  Has patient seen previous specialist? No  Medical Records:  Available in chart.  Patient was previously seen at a [facility] or [facility] facility.    REVIEW                                                      New patient packet mailed to patient: No  Medication reconciliation complete: No      Current Outpatient Medications   Medication Sig Dispense Refill     Cholecalciferol (VITAMIN D) 2000 UNITS tablet Take 2,000 Units by mouth daily 100 tablet 3     ferrous gluconate (FERGON) 324 (38 FE) MG tablet Take 1 tablet (324 mg) by mouth 2 times daily 180 tablet 3     levothyroxine (SYNTHROID/LEVOTHROID) 100 MCG tablet Take 1 tablet (100 mcg) by mouth daily 90 tablet 3       Allergies: Patient has no known allergies.        PLAN/FOLLOW-UP NEEDED                                                      Previsit review complete.  Patient will see provider at future scheduled appointment.     Patient Reminders Given:  Please, make sure you bring an updated list of your medications.   If you are having a procedure, please, present 15 minutes early.  If you need to cancel or reschedule,please call [phone].    Maty Ng RN       Pt called to do previsit phone call. Pt scheduled with  12/21/18 at 4:30 for consult for asymmetrical breasts. Pt did not answer. NO vm id. Left general message to call the [facility] back at [phone]...Maty Ng RN

## 2018-12-21 ENCOUNTER — OFFICE VISIT (OUTPATIENT)
Dept: SURGERY | Facility: CLINIC | Age: 38
End: 2018-12-21
Attending: FAMILY MEDICINE
Payer: COMMERCIAL

## 2018-12-21 VITALS — BODY MASS INDEX: 25.17 KG/M2 | HEIGHT: 61 IN | WEIGHT: 133.3 LBS

## 2018-12-21 DIAGNOSIS — N62 HYPERTROPHY OF BREAST: Primary | ICD-10-CM

## 2018-12-21 DIAGNOSIS — N62 BREAST HYPERTROPHY: Primary | ICD-10-CM

## 2018-12-21 PROCEDURE — 99203 OFFICE O/P NEW LOW 30 MIN: CPT | Performed by: PLASTIC SURGERY

## 2018-12-21 RX ORDER — CEFAZOLIN SODIUM 2 G/50ML
2 SOLUTION INTRAVENOUS
Status: CANCELLED | OUTPATIENT
Start: 2018-12-21

## 2018-12-21 RX ORDER — CEFAZOLIN SODIUM 1 G/50ML
1 INJECTION, SOLUTION INTRAVENOUS SEE ADMIN INSTRUCTIONS
Status: CANCELLED | OUTPATIENT
Start: 2018-12-21

## 2018-12-21 ASSESSMENT — MIFFLIN-ST. JEOR: SCORE: 1232.02

## 2018-12-21 NOTE — LETTER
2018         RE: Chelsey Bolanos  [address]  [address]  [facility] [address]        Dear Colleague,    Thank you for referring your patient, Chelsey Bolanos, to the [facility]. Please see a copy of my visit note below.    Service Date: 2018      REFERRING PROVIDER:  Penelope Cortes MD      PRESENTING COMPLAINT:  Consultation for breast reduction.      HISTORY OF PRESENTING COMPLAINT:  Ms. Bolanos is 38 years old.  She wears a DD to DDD bra.  She would like to be around a B or a C cup.  She has a lot of upper back, neck, shoulder pain, shoulder grooving from bra straps, inframammary fold rashes during summers and the left shoulder especially is more tender compared to the right side.  She has used all sorts of supportive garments as well as over-the-counter pain medications without continued relief.  She is here to discuss options for possible breast reduction.  She has never had a mammogram.  Her mother had breast cancer in her 50s.      PAST MEDICAL HISTORY:  Hypothyroidism and anemia (hemoglobin 11.5).      PAST SURGICAL HISTORY:  .      MEDICATIONS:  Synthroid, iron.      ALLERGIES:  None.      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      REVIEW OF SYSTEMS:  Denies chest pain, shortness of breath, MI, CVA, DVT and PE.      PHYSICAL EXAMINATION:  Vital signs stable.  She is afebrile, in no obvious distress.  She is 5 feet 1 inch, 133 pounds, BMI of 25 kg/m2.  Body surface area 1.61 m2.  On examination of her breasts, she has grade 2 ptosis.  Left breast is significantly larger than the right, about 20-25%.  She has a sternal notch to nipple distance under 40 cm.      ASSESSMENT AND PLAN:  Based upon the above findings, a diagnosis of symptomatic bilateral breast hypertrophy was made.  I had a long discussion with the patient about her findings.  I explained to her what a breast reduction would entail, showed her where the  scars would be expected.  I explained to her the expectations from surgery.  I explained to her that she may not be able to breastfeed, that she may lose nipple sensation, that she will still be asymmetric even after the surgery.  I was very clear about the fact that I cannot guarantee all her symptoms will go away, but hopefully it should help with her symptoms.  I advocated a mammogram prior to any surgery on her breasts.  All risks, benefits, and alternatives of the procedure including pain, infection, bleeding, scarring, asymmetry, seromas, hematomas, wound breakdown, wound dehiscence, prominent scar, hypertrophic scar, keloid scar, asymmetry of the breasts, inability to breast-feed, sensation, loss of the nipple and breast, skin necrosis, fat necrosis, nipple necrosis, T-junction site necrosis, DVT, PE, MI, CVA, pneumonia, renal failure and death were all explained.  They understood everything.  Prior authorization was explained.  Based on the Schnur scale, about 350 g needs to be removed from each breast.  That will be easily possible in her case.  I look forward to helping her out in the near future.  All questions were answered.  They were happy with the visit.  All exam done in the presence of my nurse.  Consent obtained.  Photographs obtained.      Time spent with patient 30 minutes, more than half was counseling.      cc:   Penelope Cortes MD   [address] [place] [address]         ROXANNA FRENCH MD             D: 2018   T: 2018   MT: KK      Name:     JOSEF MAJOR   MRN:      [id number]        Account:      [id number]   :      1980           Service Date: 2018      Document: [id number]       Again, thank you for allowing me to participate in the care of your patient.        Sincerely,        ROXANNA French MD

## 2018-12-21 NOTE — NURSING NOTE
Chelsey Bolanos's goals for this visit include: symmetrical breasts  She requests these members of her care team be copied on today's visit information: no    PCP: Ginny Helm    Referring Provider:  Penelope Cortes MD  [address]  [place] [address]    There were no vitals taken for this visit.    Do you need any medication refills at today's visit? No    Dulce Yates LPN

## 2018-12-22 NOTE — PROGRESS NOTES
Service Date: 2018      REFERRING PROVIDER:  Penelope Cortes MD      PRESENTING COMPLAINT:  Consultation for breast reduction.      HISTORY OF PRESENTING COMPLAINT:  Ms. Bolanos is 38 years old.  She wears a DD to DDD bra.  She would like to be around a B or a C cup.  She has a lot of upper back, neck, shoulder pain, shoulder grooving from bra straps, inframammary fold rashes during summers and the left shoulder especially is more tender compared to the right side.  She has used all sorts of supportive garments as well as over-the-counter pain medications without continued relief.  She is here to discuss options for possible breast reduction.  She has never had a mammogram.  Her mother had breast cancer in her 50s.      PAST MEDICAL HISTORY:  Hypothyroidism and anemia (hemoglobin 11.5).      PAST SURGICAL HISTORY:  .      MEDICATIONS:  Synthroid, iron.      ALLERGIES:  None.      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      REVIEW OF SYSTEMS:  Denies chest pain, shortness of breath, MI, CVA, DVT and PE.      PHYSICAL EXAMINATION:  Vital signs stable.  She is afebrile, in no obvious distress.  She is 5 feet 1 inch, 133 pounds, BMI of 25 kg/m2.  Body surface area 1.61 m2.  On examination of her breasts, she has grade 2 ptosis.  Left breast is significantly larger than the right, about 20-25%.  She has a sternal notch to nipple distance under 40 cm.      ASSESSMENT AND PLAN:  Based upon the above findings, a diagnosis of symptomatic bilateral breast hypertrophy was made.  I had a long discussion with the patient about her findings.  I explained to her what a breast reduction would entail, showed her where the scars would be expected.  I explained to her the expectations from surgery.  I explained to her that she may not be able to breastfeed, that she may lose nipple sensation, that she will still be asymmetric even after the surgery.  I was very clear  about the fact that I cannot guarantee all her symptoms will go away, but hopefully it should help with her symptoms.  I advocated a mammogram prior to any surgery on her breasts.  All risks, benefits, and alternatives of the procedure including pain, infection, bleeding, scarring, asymmetry, seromas, hematomas, wound breakdown, wound dehiscence, prominent scar, hypertrophic scar, keloid scar, asymmetry of the breasts, inability to breast-feed, sensation, loss of the nipple and breast, skin necrosis, fat necrosis, nipple necrosis, T-junction site necrosis, DVT, PE, MI, CVA, pneumonia, renal failure and death were all explained.  They understood everything.  Prior authorization was explained.  Based on the Schnur scale, about 470 g needs to be removed from each breast.  That will be easily possible in her case.  I look forward to helping her out in the near future.  All questions were answered.  They were happy with the visit.  All exam done in the presence of my nurse.  Consent obtained.  Photographs obtained.      Time spent with patient 30 minutes, more than half was counseling.      cc:   Penelope Cortes MD   [address] [place] [address]          MJ BAUTISTA MD             D: 2018   T: 2018   MT: GONZALO      Name:     JOSEF MAJOR   MRN:      [id number]        Account:      [id number]   :      1980           Service Date: 2018      Document: [id number]

## 2018-12-27 ENCOUNTER — TELEPHONE (OUTPATIENT)
Dept: SURGERY | Facility: CLINIC | Age: 38
End: 2018-12-27

## 2018-12-27 NOTE — TELEPHONE ENCOUNTER
Pt called, No answer.  does not identify pt. Left general message for pt to call the [facility] back at [phone].    Per Dr. Taylor's request, writer called and left message to return clinic call. Patient needs to be informed that if the PA is approved for a breast reduction and patient wants to schedule surgery, a mammogram needs to be done prior to surgery.    Dulce Yates LPN

## 2018-12-27 NOTE — TELEPHONE ENCOUNTER
----- Message from Elvie Zuniga sent at 12/26/2018 11:48 AM CST -----  Regarding: RE: PA schedule  This has been submitted to [employer] for PA    Elvie Hahn   ----- Message -----  From: ROXANNA French MD  Sent: 12/21/2018   5:05 PM  To: Maty Ng RN, Nini Abdul, #  Subject: PA Elvie De Jesus, please PA for [facility], [facility] MG.    Evonne, plan for Feb J, needs mammogram    Orders placed.    Thanks    Bryon

## 2018-12-27 NOTE — TELEPHONE ENCOUNTER
Surgery scheduled with the patient pending authorization from her insurance company.    Date Scheduled: 2-15-19 at 8:30am   Facility: [facility]  Surgeon: Dr. French   Post-op appointment scheduled:   Next 5 appointments (look out 90 days)    Mar 01, 2019  4:00 PM CST  Nurse Only with NURSE ONLY [facility] PLASTICS  [facility] [address] [place] [id number] [phone]           scheduled?: No  Surgery packet/instructions confirmed received?  Yes  Special Considerations: Pt instructed that mammogram needed before surgery.  Evonne Abdul, Surgery Scheduling Coordinator

## 2018-12-28 ENCOUNTER — HOSPITAL ENCOUNTER (OUTPATIENT)
Facility: AMBULATORY SURGERY CENTER | Age: 38
End: 2018-12-28
Attending: PLASTIC SURGERY | Admitting: PLASTIC SURGERY
Payer: COMMERCIAL

## 2018-12-28 NOTE — TELEPHONE ENCOUNTER
Pt called, No answer.  Left general message for pt to call the [facility] clinic back at [phone]....Maty Ng RN

## 2019-01-03 ENCOUNTER — TELEPHONE (OUTPATIENT)
Dept: SURGERY | Facility: CLINIC | Age: 39
End: 2019-01-03

## 2019-01-03 NOTE — TELEPHONE ENCOUNTER
----- Message from ROXANNA French MD sent at 12/26/2018  3:54 PM CST -----  Regarding: RE: PA schedule  350 gms as in the note. Years.    Bryon  ----- Message -----  From: Elvie Zuniga  Sent: 12/26/2018   2:55 PM  To: ROXANNA French MD, #  Subject: RE: PA schedule                                    ,  What amount of grams will you be removing per breast ?  What has been the length of her issues?    Elvie Hahn   ----- Message -----  From: ROXANNA French MD  Sent: 12/21/2018   5:05 PM  To: Maty Ng RN, Nini Abdul, #  Subject: PA schedule                                      Elvie Nicole, please PA for [facility], [facility].    Evonne, plan for Feb J, needs mammogram    Orders placed.    Thanks    Bryon

## 2019-01-08 NOTE — TELEPHONE ENCOUNTER
Pt called, no answer. Left message for pt to call the [facility] back at [phone].    Dulce Yates LPN

## 2019-01-16 NOTE — TELEPHONE ENCOUNTER
Case still pending with [employer] per [facility]   Case ref# [id number] should have an answer in 48 hours   Patient notified

## 2019-01-25 NOTE — TELEPHONE ENCOUNTER
Message sent to FC to advise if PA was approved so surgery instructions can be reviewed. If approved RN will reach out to review surgery instructions...Maty Ng RN

## 2019-01-25 NOTE — TELEPHONE ENCOUNTER
I called and spoke with Ami sher of the Medical directors at [facility]- they will have an answer for me by Monday 1/28 at the latest

## 2019-01-30 NOTE — TELEPHONE ENCOUNTER
Ami from [facility] called regarding a denial for Chelsey. Case #[id number]. Case came back denied from the  Medical director Clinical- policy states that per the patient's body surface area surgeon should be removing at least 470 grams per breast  in order for them to approve. A  Peer to peer is available for 14 days to explain the reasoning behind the request- Dr. French can call--[phone]  Opt. 1   Case ref#[id number]

## 2019-01-30 NOTE — TELEPHONE ENCOUNTER
is requesting to have pt return to clinic. Pt called to notify. Pt is asking if surgery date of 2/15/19 will need to be rescheduled. RN notified pt that RN will consult with  on this and update pt and schedule appt.  Pt states understanding..Maty Ng RN

## 2019-01-30 NOTE — TELEPHONE ENCOUNTER
Dr. French please call or have a nurse call [employer]. They need an update via peer to peer over the phone. they won't accept my updated office note since my original request was denied. I did speak with patient and she has concerns on the increased removal. Please advise

## 2019-02-06 NOTE — TELEPHONE ENCOUNTER
Was the peer to peer ever completed? Allyson has now denied this patient's surgery. I'm going to call patient so we can get surgery cancelled.

## 2019-02-07 NOTE — TELEPHONE ENCOUNTER
I called patient to discuss denial of BRM. Patient didn't want to cancel at the time. I let her know at this time 2/15 will not be covered. she stated she has an appt with Manolo on 2/12 at 4pm? I didn't see it in her chart? Can someone follow up with her regarding the uncertainty of next week

## 2019-02-07 NOTE — TELEPHONE ENCOUNTER
Patient needs to be cancelled due to denial from [employer]. If Manolo calls to do the peer to peer still there is possibility for rescheduling

## 2019-02-08 VITALS — WEIGHT: 130 LBS | HEIGHT: 61 IN | BODY MASS INDEX: 24.55 KG/M2

## 2019-02-08 ASSESSMENT — MIFFLIN-ST. JEOR: SCORE: 1217.06

## 2019-02-08 NOTE — TELEPHONE ENCOUNTER
Per  plan is to go ahead with surgery and pt to be scheduled to see  prior to surgery.   in ortho clinic only next Tuesday and no regular openings. Pt spoke with Ole DE LA ROSA to see what time pt could be seen on 2/12. Ole advised 2:20 or 2:40pm. Pt called and scheduled on 2/12 at 2:40pm. Pt states that if she cannot make this appt due to needing to find a ride for her kids then she will call back to cancel this appt. Pt also notified to schedule a pre-op appt with her pcp. Pt states understanding.  RN advised to call the clinic back at [phone]...Maty gN RN

## 2019-02-11 NOTE — TELEPHONE ENCOUNTER
Pt called and left a vm stating that she would prefer that 2:20pm appt tomorrow with . RN called pt and notified pt of earlier appt at 1:20pm if she preferred that. Pt states that she would prefer the 1:20pm appt. Pt scheduled...Maty Ng RN

## 2019-02-11 NOTE — TELEPHONE ENCOUNTER
Per Dr. Manolo CHOI. I called the peer-peer. They state that we need to appeal it. So my plan is to do the procedure. Then we can see if they approve it or not. If denied then we can appeal. As long as we meet all their criteria they should pay.     Thanks     Bryon

## 2019-02-12 ENCOUNTER — OFFICE VISIT (OUTPATIENT)
Dept: FAMILY MEDICINE | Facility: CLINIC | Age: 39
End: 2019-02-12
Payer: COMMERCIAL

## 2019-02-12 VITALS
WEIGHT: 130 LBS | HEIGHT: 61 IN | BODY MASS INDEX: 24.55 KG/M2 | RESPIRATION RATE: 18 BRPM | OXYGEN SATURATION: 100 % | DIASTOLIC BLOOD PRESSURE: 77 MMHG | SYSTOLIC BLOOD PRESSURE: 111 MMHG | HEART RATE: 69 BPM | TEMPERATURE: 98.6 F

## 2019-02-12 DIAGNOSIS — Z01.818 PREOP GENERAL PHYSICAL EXAM: Primary | ICD-10-CM

## 2019-02-12 DIAGNOSIS — M54.50 ACUTE LEFT-SIDED LOW BACK PAIN WITHOUT SCIATICA: ICD-10-CM

## 2019-02-12 DIAGNOSIS — N62 HYPERTROPHY OF BREAST: ICD-10-CM

## 2019-02-12 DIAGNOSIS — D50.9 IRON DEFICIENCY ANEMIA, UNSPECIFIED IRON DEFICIENCY ANEMIA TYPE: ICD-10-CM

## 2019-02-12 LAB
ERYTHROCYTE [DISTWIDTH] IN BLOOD BY AUTOMATED COUNT: 12.4 % (ref 10–15)
HCT VFR BLD AUTO: 37.3 % (ref 35–47)
HGB BLD-MCNC: 12.4 G/DL (ref 11.7–15.7)
MCH RBC QN AUTO: 29.9 PG (ref 26.5–33)
MCHC RBC AUTO-ENTMCNC: 33.2 G/DL (ref 31.5–36.5)
MCV RBC AUTO: 90 FL (ref 78–100)
PLATELET # BLD AUTO: 282 10E9/L (ref 150–450)
RBC # BLD AUTO: 4.15 10E12/L (ref 3.8–5.2)
WBC # BLD AUTO: 6.7 10E9/L (ref 4–11)

## 2019-02-12 PROCEDURE — 36415 COLL VENOUS BLD VENIPUNCTURE: CPT | Performed by: FAMILY MEDICINE

## 2019-02-12 PROCEDURE — 85027 COMPLETE CBC AUTOMATED: CPT | Performed by: FAMILY MEDICINE

## 2019-02-12 PROCEDURE — 99214 OFFICE O/P EST MOD 30 MIN: CPT | Performed by: FAMILY MEDICINE

## 2019-02-12 RX ORDER — MULTIVIT WITH MINERALS/LUTEIN
250 TABLET ORAL DAILY
COMMUNITY

## 2019-02-12 ASSESSMENT — MIFFLIN-ST. JEOR: SCORE: 1217.06

## 2019-02-12 NOTE — TELEPHONE ENCOUNTER
Received notification from  via staff message today  that it is preferred to cancel the surgery and appeal which may take up to 30days.  also included in staff message and advised following this plan. Elvie from  notified RN that pt was called and notified of this plan and of appt being cancelled today. Appt with  today cancelled and also future nurse visit cancelled...Maty Ng RN

## 2019-02-12 NOTE — PATIENT INSTRUCTIONS
Proceed with surgery as planned.  Continue current medication prior to surgery.  Do not take Advil, ibuprofen for 1-2 days prior to surgery.  Okay to use tylenol or acetaminophen as needed.    If the low back pain radiating to the left leg recurs - you can take Ibuprofen (advil)  600 mg (3 over the counter pills) three times a day with food to decrease inflammation for 3-5 days as long as it is not right before your surgery.        Before Your Surgery      Call your surgeon if there is any change in your health. This includes signs of a cold or flu (such as a sore throat, runny nose, cough, rash or fever).    Do not smoke, drink alcohol or take over the counter medicine (unless your surgeon or primary care doctor tells you to) for the 24 hours before and after surgery.    If you take prescribed drugs: Follow your doctor s orders about which medicines to take and which to stop until after surgery.    Eating and drinking prior to surgery: follow the instructions from your surgeon    Take a shower or bath the night before surgery. Use the soap your surgeon gave you to gently clean your skin. If you do not have soap from your surgeon, use your regular soap. Do not shave or scrub the surgery site.  Wear clean pajamas and have clean sheets on your bed.

## 2019-02-12 NOTE — RESULT ENCOUNTER NOTE
The anemia previously present has resolved and your blood counts are normal now.  I would recommend you continue to take the iron supplement for another 4-6 week then you can try off the medication.  Please call or [employer] message me if you have any questions.    ELISAK

## 2019-02-12 NOTE — PROGRESS NOTES
[address] [address]  [phone]  Dept: [phone]    PRE-OP EVALUATION:  Today's date: 2019    Chelsey Bolanos (: 1980) presents for pre-operative evaluation assessment as requested by ROXANNA Saez.  She requires evaluation and anesthesia risk assessment prior to undergoing surgery/procedure for treatment of BILATERAL BREAST REDUCTION .    Proposed Surgery/ Procedure: BILATERAL BREAST REDUCTION  Date of Surgery/ Procedure: 2/15/19  Time of Surgery/ Procedure: 8:30AM  Hospital/Surgical Facility: [facility]   Fax number for surgical facility: not needed   Primary Physician: Penelope Cortes  Type of Anesthesia Anticipated: Combined General with Block    Patient has a Health Care Directive or Living Will:  NO    1. NO - Do you have a history of heart attack, stroke, stent, bypass or surgery on an artery in the head, neck, heart or legs?  2. YES - DO YOU EVER HAVE ANY PAIN OR DISCOMFORT IN YOUR CHEST?  Discomfort in the left breast area - tender to touch.     3. NO - Do you have a history of  Heart Failure?  4. NO - Are you troubled by shortness of breath when: walking on the level, up a slight hill or at night?  5. NO - Do you currently have a cold, bronchitis or other respiratory infection?  6. NO - Do you have a cough, shortness of breath or wheezing?  7. NO - Do you sometimes get pains in the calves of your legs when you walk?  8. YES - DO YOU OR ANYONE IN YOUR FAMILY HAVE PREVIOUS HISTORY OF BLOOD CLOTS? Mother with stent in heart  9. NO - Do you or does anyone in your family have a serious bleeding problem such as prolonged bleeding following surgeries or cuts?  10. YES - HAVE YOU EVER HAD PROBLEMS WITH ANEMIA OR BEEN TOLD TO TAKE IRON PILLS? Currently taking iron supplements   11. NO - Have you had any abnormal blood loss such as black, tarry or bloody stools, or abnormal vaginal bleeding?  12. NO - Have you ever had a blood  transfusion?  13. NO - Have you or any of your relatives ever had problems with anesthesia?  14. NO - Do you have sleep apnea, excessive snoring or daytime drowsiness?  15. NO - Do you have any prosthetic heart valves?  16. NO - Do you have prosthetic joints?  17. NO - Is there any chance that you may be pregnant?      HPI:     HPI related to upcoming procedure: 38 year old with history of breast enlargement L>R with left sided chest wall anterior and lateral, breast tenderness, discomfort in shoulder and upper back.  Has had rashes under breasts in past.      See problem list for active medical problems.  Problems all longstanding and stable, except as noted/documented.  See ROS for pertinent symptoms related to these conditions.                                                                                                                                                          .    MEDICAL HISTORY:     Patient Active Problem List    Diagnosis Date Noted     Vitamin D deficiency 12/18/2016     Priority: Medium     Acquired hypothyroidism 12/06/2016     Priority: Medium     Iron deficiency anemia, unspecified iron deficiency anemia type 12/06/2016     Priority: Medium     Anemia 10/16/2013     Priority: Medium     CARDIOVASCULAR SCREENING; LDL GOAL LESS THAN 160 10/15/2013     Priority: Medium     Hypothyroidism 10/15/2013     Priority: Medium      Past Medical History:   Diagnosis Date     History of anemia 10/15/2013     Thyroid disease      Past Surgical History:   Procedure Laterality Date     GYN SURGERY      C section x 2     Current Outpatient Medications   Medication Sig Dispense Refill     Cholecalciferol (VITAMIN D) 2000 UNITS tablet Take 2,000 Units by mouth daily 100 tablet 3     ferrous gluconate (FERGON) 324 (38 FE) MG tablet Take 1 tablet (324 mg) by mouth 2 times daily 180 tablet 3     levothyroxine (SYNTHROID/LEVOTHROID) 100 MCG tablet Take 1 tablet (100 mcg) by mouth daily 90 tablet 3     vitamin  "C (ASCORBIC ACID) 250 MG tablet Take 250 mg by mouth daily       OTC products: None, except as noted above    No Known Allergies   Latex Allergy: NO    Social History     Tobacco Use     Smoking status: Never Smoker     Smokeless tobacco: Never Used   Substance Use Topics     Alcohol use: No     History   Drug Use No       REVIEW OF SYSTEMS:   CONSTITUTIONAL: NEGATIVE for fever, chills, change in weight  INTEGUMENTARY/SKIN: NEGATIVE for worrisome rashes, moles or lesions  EYES: NEGATIVE for vision changes or irritation  ENT/MOUTH: NEGATIVE for ear, mouth and throat problems  RESP: NEGATIVE for significant cough or SOB  BREAST: see HPI  CV: NEGATIVE for chest pain, palpitations or peripheral edema  GI: NEGATIVE for nausea, abdominal pain, heartburn, or change in bowel habits  : NEGATIVE for frequency, dysuria, or hematuria  MUSCULOSKELETAL: NEGATIVE for significant arthralgias or myalgia  NEURO: NEGATIVE for weakness, dizziness or paresthesias  ENDOCRINE: NEGATIVE for temperature intolerance, skin/hair changes  HEME: NEGATIVE for bleeding problems  PSYCHIATRIC: NEGATIVE for changes in mood or affect    EXAM:   /77 (BP Location: Right arm, Patient Position: Sitting, Cuff Size: Adult Regular)   Pulse 69   Temp 98.6  F (37  C) (Oral)   Resp 18   Ht 1.549 m (5' 1\")   Wt 59 kg (130 lb)   LMP 02/02/2019   SpO2 100%   BMI 24.56 kg/m      GENERAL APPEARANCE: healthy, alert and no distress     EYES: EOMI, PERRL     HENT: ear canals and TM's normal and nose and mouth without ulcers or lesions     NECK: no adenopathy, no asymmetry, masses, or scars and thyroid normal to palpation     RESP: lungs clear to auscultation - no rales, rhonchi or wheezes     BREAST: large breasts without masses, or nipple discharge, no palpable axillary masses or adenopathy and tenderness left lateral breast without mass or overlying skin change noted.     CV: regular rates and rhythm, normal S1 S2, no S3 or S4 and no murmur, click or " rub     ABDOMEN:  soft, nontender, no HSM or masses and bowel sounds normal     MS: extremities normal- no gross deformities noted and tender to palpation paravertebral muscles on the left lumbar.     SKIN: no suspicious lesions or rashes     NEURO: Normal strength and tone, sensory exam grossly normal, mentation intact and speech normal     PSYCH: mentation appears normal. and affect normal/bright     LYMPHATICS: No cervical or axillary adenopathy    DIAGNOSTICS:     EKG: Not indicated due to non-vascular surgery and low risk of event (age <65 and without cardiac risk factors)  Labs Resulted Today:   Results for orders placed or performed in visit on 02/12/19   CBC with platelets   Result Value Ref Range    WBC 6.7 4.0 - 11.0 10e9/L    RBC Count 4.15 3.8 - 5.2 10e12/L    Hemoglobin 12.4 11.7 - 15.7 g/dL    Hematocrit 37.3 35.0 - 47.0 %    MCV 90 78 - 100 fl    MCH 29.9 26.5 - 33.0 pg    MCHC 33.2 31.5 - 36.5 g/dL    RDW 12.4 10.0 - 15.0 %    Platelet Count 282 150 - 450 10e9/L       Recent Labs   Lab Test 09/18/18  1730 09/06/17  1826   HGB 11.5* 11.4*    281     --    POTASSIUM 3.9  --    CR 0.56  --         IMPRESSION:   Reason for surgery/procedure: Breast hypertrophy // Bilateral breast reduction  Diagnosis/reason for consult: surgical clearance    The proposed surgical procedure is considered INTERMEDIATE risk.    REVISED CARDIAC RISK INDEX  The patient has the following serious cardiovascular risks for perioperative complications such as (MI, PE, VFib and 3  AV Block):  No serious cardiac risks  INTERPRETATION: 0 risks: Class I (very low risk - 0.4% complication rate)    The patient has the following additional risks for perioperative complications:  No identified additional risks      ICD-10-CM    1. Preop general physical exam Z01.818    2. Hypertrophy of breast N62    3. Iron deficiency anemia, unspecified iron deficiency anemia type D50.9 CBC with platelets   4. Acute left-sided low back pain  without sciatica M54.5        RECOMMENDATIONS:         --Patient is to take all scheduled medications on the day of surgery EXCEPT for modifications listed below.    Anticoagulant or Antiplatelet Medication Use  Not currently using but if use prior to surgery for back pain instructions given.  NSAIDS: Ibuprofen (Motrin):         Stop one day prior to surgery        APPROVAL GIVEN to proceed with proposed procedure, without further diagnostic evaluation       Signed Electronically by: Penelope Cortes MD    Copy of this evaluation report is provided to requesting physician.    [facility] Preop Guidelines    Revised Cardiac Risk Index

## 2019-03-25 NOTE — TELEPHONE ENCOUNTER
[facility] [phone] opt 2  appeal for REF# [id number] still pending review.. 30 days minimum and up to 60 days max review no decision made      Called patient to go over questions and update review status- Patient would like call back if approved or denied

## 2019-04-17 ENCOUNTER — TELEPHONE (OUTPATIENT)
Dept: SURGERY | Facility: CLINIC | Age: 39
End: 2019-04-17

## 2019-04-17 NOTE — TELEPHONE ENCOUNTER
PA has been approved for BRM CPT:79305  With the approved amount of 470 grams per breast removed [employer] Dr. French Auth# [id number] 2/14/19- 7/13/19

## 2019-04-17 NOTE — TELEPHONE ENCOUNTER
PA has been approved for BRM CPT:83983  With the approved amount of 470 grams per breast removed [facility] Dr. French Auth# [id number] 2/14/19- 7/13/19

## 2019-04-24 NOTE — TELEPHONE ENCOUNTER
Pt called and states that she requests that RN call pt back. Will try pt again at a later time...Maty Ng RN          Surgery Instructions    Always follow your surgeon s instructions. If you don t, your surgery could be cancelled. Please use the following checklist.  Your surgery is on: The surgery scheduler will contact you within 1 week of your consult with the surgeon. If you do not hear from them, please call the clinic or RN Care Coordinator for your provider.    Time: Prearrival times can vary depending on location/type of surgery.  [place] - 2 hour pre-arrival  [facility] [place] - 2 hour pre-arrival  [place] - 1 hour pre-arrival    Note:  These times may change. A nurse will call you before surgery to confirm. If you have not received a call or if you have more questions, please call us on the working day before your surgery:  ? [place]: [phone] or [phone] (9am to 5:30pm)  ? [facility] [place]: [phone] (8am to 6pm)  ? [place]: [phone] (9am to 5pm)  ? [facility] [phone] (7am to 4pm)  Prior to surgery  ? Have a pre-op physical exam with your Primary Doctor within 30 days of surgery  - Ask your doctor to send all of your results to the surgery center/hospital before surgery. Your doctor also may ask you to bring the results with you on the day of surgery.  - Tell your doctor if:  - You are allergic to latex or rubber (latex and rubber gloves are often used in medical care).  - You are taking any medicines (including aspirin), vitamins, or herbal products. You may need to stop taking some medicines before surgery.  - You have any medical problems (allergies, diabetes, or heart disease, for example).  - You have a pacemaker or an AICD (automatic implanted cardiac defibrillator). If you do, please bring the ID card with you on the day of surgery.  - People who smoke have a higher risk of infection after surgery. Ask your doctor how you can quit smoking.  - If you Primary  Doctor is not within the [employer] system, you will need to have your pre-op physical faxed to us to be scanned into your chart.  - [place]: [phone] or [phone]  - [facility] ([place]): [phone]  - [facility] ([facility]): [phone]  ? Call your insurance company. Ask if you need pre-approval for your surgery. If you do not have insurance, please let us know. If you wish to speak to the , please alert the clinic staff so this can be arranged.  ? Arrange for someone to drive you home after surgery.  will need to be a responsible adult (18 years or older) that will provide transportation to and from surgery and stay in the waiting room during your surgery. You may not drive yourself or take public transportation to and from surgery.  ? Arrange for someone to stay with you for 24 hours after you go home. This person must be a responsible adult (18 years or older).  ? Call your surgeon or their nurse if there is any change in your health (cold, flu, infections, hospitalizations).  ? Do not smoke, drink alcohol, or take over-the-counter medicine for 24 hours before and after surgery.  ? If you take prescribed drugs, you may need to stop them until after the surgery.  Discuss what medications to take or not take prior to surgery with your Primary Doctor at your pre-op physical. Avoid over-the-counter blood-thinning medications such as Aspirin, Ibuprofen, vitamin E, or fish oil 7 days prior to surgery (unless otherwise directed by your Primary Doctor). Tylenol is a good alternative for mild pain relief prior to surgery.  ? Eating and drinking guidelines prior to surgery:  - Stop all solid food consumption 8 hours prior to surgery  - You may drink clear liquids up to 2 hours prior to surgery (water, fruit juices without pulp, jello, tea/coffee without creamer, sports drinks, clear-fat free broth (bouillon or consomme), popsicles (without milk, bits of fruit, or  seeds/nuts)  ? Follow instructions given for showering or bathing before surgery.    - Use 8 ounces of antiseptic surgical soap, like:  - Hibiclens, Scrub Care, or Exidine  - You can find it at your local pharmacy, clinic, or retail store. If you have trouble, ask your pharmacist to help you find the right substitute.  - Please wash with one of the above soaps twice before coming to the hospital for your surgery. This will decrease bacteria (germs) on your skin. It will also help reduce your chance of infection after surgery.  - Items you will need for showering:  - 4 newly washed washcloths  - 2 newly washed towels  - 8 ounces of one of the above soaps  - Following these instructions:  - The evening before surgery: Shower or bathe as you normally would, using your regular soap and a clean washcloth. Give special attention to places where your incision (surgical cut) or catheters will be. This includes your groin area. Rinse well. You may wash your hair with your regular shampoo. Next, wash your body with 4 ounces of the antiseptic soap. Use a clean, damp washcloth and gently clean your body (from the chin down). If your surgery involves your head, use the special soap on your head and scalp. Rinse well and dry off using a newly washed towel.  - The morning of surgery: Repeat the same process as the evening shower.  - Other suggestions:    Do not shave within 12 inches of your incision (surgical cut) area for at least 3 days before surgery. Shaving can make small cuts in the skin. This puts you at higher risk of infection.    Wear freshly washed pajamas or clothing after your evening shower.    Wear freshly washed clothes the day of surgery.    Wash and change your bed sheets the day before surgery to have clean bed sheets after your shower and when you get home from surgery.    If you have trouble washing all areas, make sure someone helps you.    Don't use any deodorant, lotion or powder after your  shower.    Women who are menstruating should wear a fresh sanitary pad to the hospital.  ? Do not wear or add deodorant, cologne, lotion, makeup, nail polish or jewelry to surgery. If you wear fake nails, please remove at least one nail before coming to surgery (an oxygen monitor needs to be placed on your finger during surgery).  ? Bring these items to the surgery center/hospital:  - Insurance card  - Money for parking and co-pays, if needed  - A list of all the medicines you take. Include vitamins, minerals, herbs, and over-the-counter drugs.  Note any drug allergies.  - A copy of your advance health care directive, if you have one. This tells us what treatment you would want--and who would make health care decisions--if you could no longer speak for yourself. You may request this form in advance or download it from [email or web address].  - A case for glasses, contact lenses, hearing aids, or dentures.  - Your inhaler or CPAP machine, if you use these at home.  ? Leave extra cash, jewelry, and other valuables at home.  When you arrive  When you get to the surgery center/hospital, you will:  ? Check in. If you are under age 18, you must be with a parent or legal guardian.  ? Sign consent forms, if you haven t already. These forms state that you know the risks and benefits of surgery. When you sign the forms, you give us permission to do the surgery. Do not sign them unless you understand what will happen during and after your surgery. If you have any questions about your surgery, ask to speak with your doctor before you sign the forms. If you don t understand the answers, ask again.  ? Receive a copy of the Patient s Bill of Rights. If you do not receive a copy, please ask for one.  ? Change into hospital clothes. Your belongings will be placed in a bag. We will return them to you after surgery.  ? Meet with the anesthesia provider. He or she will tell you what kind of anesthesia (medicine) will be used to keep  you comfortable during surgery.  Remember: it s okay to remind doctors and nurses to wash their hands before touching you.  In most cases, your surgeon will use a marker to write his or her initials on the surgery site. This ensures that the exact site is operated on.  For safety reasons, we will ask you the same questions many times. For example, we may ask your name and birth date over and over again.  Friends and family can stay with you until it s time for surgery. While you re in surgery, they will be in the waiting area. Please note that cell phones are not allowed in some patient care areas.  If you have questions about what will happen in the operating room, talk to your care team.  After surgery  We will move you to a recovery room, where we will watch you closely. If you have any pain or discomfort, tell your nurse. He or she will try to make you comfortable.  If you are staying overnight, we will move you to your hospital room after you are awake.  If you are going home, we will move you to another room. Friends and family may be able to join you. The length of time you spend in recovery depend on the type of medicine you received, your medical condition, the type of surgery you had, or your response to the anesthesia given during your procedure.  When you are discharged from the recovery room, the nurses will review instructions with you and your caregiver.  ? Please wash your hands every time you touch the wound or change bandages or dressings.  ? Do not submerge the wound in water.  You may not use a bathtub or hot tub until the wound is closed. The wait time frame is generally 2-3 weeks, but any open area can be a source of incoming bacteria, so it is better to be on the safe side and avoid water submersion until your wound is fully healed.  ? You may take a shower 24 hours after surgery. Double check with your surgeon if it is OK for water to run over the wound, whether it has been sutured, stapled,  glued, or is open. You may gently wash the wound using the antiseptic soap provided for your pre-surgery showering (do not use a washcloth). Any mild soap will work as well.  ? Many surgical wounds will have small white strips of tape on them called steri-strips.  Do not remove these. The edges will curl and fall off within 7-10 days with normal showering.  ? If you are going home with sutures (stitches) or staples, you must return to the clinic to have them taken out, usually within 1-2 weeks. Some stitches are dissolvable and do not require removal. Make sure to clarify with your surgeon or surgery nurse reviewing discharge paperwork what kind of sutures you have.  ? Signs and symptoms of infection include:  - Fever, temperature over 101.5   F  - Redness  - Swelling  - Increased pain  - Green or yellow drainage which may or may not have a foul odor  Dealing with pain  A nurse will check your comfort level often during your stay. He or she will work with you to manage your pain.  Remember:  ? All pain is real. There are many ways to control pain. We can help you decide what works best for you.  ? Ask for pain medicine when you need it. Don t try to  tough it out --this can make you feel worse. Always take your medicine as ordered.  ? Medicine doesn t work the same for everyone. If your medicine isn t working, tell your nurse. There may be other medicines or treatments we can try.  Going home  We will let you know when you re ready to leave the surgery center or hospital. Before you leave, we will tell you how to care for yourself at home and prevent infections. If you do not understand something, please say so. We will answer any questions you have. We will then help you get ready to leave.  Remember, you must have a responsible adult (18 years or older) to stay with you 24 hours after you leave the hospital.  Take it easy when you get home. You will need some time to recover--you may be more tired than you realize  at first. Rest and relax for at least the first 24 hours at home. You ll feel better and heal faster if you take good care of yourself.  Follow the discharge instructions that are given to you when you leave the surgery center or hospital  Please call the clinic if you experience any problems during regular clinic hours (Monday-Friday 8:00am-5:00pm).  If you experience problems during non-clinic hours, please call the [facility] on-call line at [phone] and ask the  to page the on-call Provider for your specialty. The on-call Provider will call you back and can triage your symptoms and further advise. If you are having an emergency, always call 911 or seek immediate evaluation at the Emergency Room.  Locations  [facility]  Same-day surgery center - 2nd floor, check-in #5  [address] [place] [address]  [phone]  www.[facility].[place].[facility] ([facility])  [address] [place] [id number] [phone]   [email or web address]    [facility]  [address] [place] [address]  [phone] (patient registration)  [phone] (main line)  www.[email or web address]  [facility]  [address]  [place], [place] [address] [place] [address], 3rd floor for check-in  [phone] (patient registration)  [phone] (main line)  www.[facility].org  [facility]  [address] [place] [address]  [phone]  www.[email or web address]  [facility]  [address] [place] [address]  [phone]  www.[email or web address].[email or web address]  [facility]  [address] [facility] [place] [address]  [phone]  www.[facility].[place].[facility]  [address] [place]  [place] [address]  [phone]  [email or web address]  [facility]  [address] [place] [address]  [phone]  [email or web address]  [address] [address]  [phone]  [email or web address]

## 2019-04-24 NOTE — TELEPHONE ENCOUNTER
Date Scheduled: 6-7-19  Facility: [facility]  Surgeon: Dr. French  Post-op appointment scheduled:   Next 5 appointments (look out 90 days)    Jun 21, 2019  4:00 PM CDT  Nurse Only with NURSE ONLY [facility]  [facility] ([facility] [address] [place] [id number] [phone]           scheduled?: No  Surgery packet/instructions confirmed received?  Yes  Special Considerations:   Evonne Abdul, Surgery Scheduling Coordinator

## 2019-04-29 NOTE — TELEPHONE ENCOUNTER
Pt called and notified of information below. Pt verbalized understanding and has no further questions or concerns..Maty Ng RN

## 2019-05-21 ENCOUNTER — TELEPHONE (OUTPATIENT)
Dept: SURGERY | Facility: CLINIC | Age: 39
End: 2019-05-21

## 2019-05-21 ENCOUNTER — TELEPHONE (OUTPATIENT)
Dept: DERMATOLOGY | Facility: CLINIC | Age: 39
End: 2019-05-21

## 2019-05-21 DIAGNOSIS — N62 HYPERTROPHY OF BREAST: Primary | ICD-10-CM

## 2019-05-21 NOTE — LETTER
Ms.Nazia Bolanos  [address] [address] [place] [address] N  [address]  [facility] [address]        May 22, 2019    Dear MsMaryuriLuis Miguel,    Per our conversation earlier today I am emailing you the post operative instructions pertaining to your upcoming surgery with  on 6/7/19 at 9:15am. I have also included the link to some breast reconstruction videos that may be helpful. If you have any further questions please let me know by calling [phone]      Have a wonderful day    RJ Rutledge       [email or web address]          BREAST REDUCTION POST-OPERATIVE INSTRUCTIONS    Instructions       Have someone drive you home after surgery and help you at home for 1-2      days.      Get plenty of rest.      Follow balanced diet.      Decreased activity may promote constipation, so you may want to add      more raw fruit to your diet, and be sure to increase fluid intake. Your doctor       may also order a stool softener.      Take pain medication as prescribed. Do not take aspirin or any products      containing aspirin.      Do not drink alcohol when taking pain medications.      Even when not taking pain medications, no alcohol for 3 weeks as it      causes fluid retention.      If you are taking vitamins with iron, resume these as tolerated.      Do not smoke, as smoking delays healing and increases the risk of      complications.    Activities      Do not drive fpr 10 days following your procedure and until you are no longer taking        any pain medications (narcotics).      Do not drive until you have full range of motion with your arms and can stop the car       or swerve in an emergency.      Start walking the evening of surgery, this helps to reduce swelling and       lowers the chance of blood clots.      Refrain from vigorous activities for 2-6 weeks.  Increase activity gradually as tolerated.      After 2 weeks, you may perform lower body exercise, but must  wait the full 6 weeks       prior to performing upper body exercise      Avoid lifting anything over 5 pounds for 2 weeks.      Resume social and employment activities in about 2 weeks (if not too strenuous).    Incision Care      You may shower in 48 hours.  If drainage tubes have been used, you may shower       48 hours after removal of the drains. The ACE wrap (if used) may be rewrapped as needed (if too tight or loose). Use it for support and may subtitute with a sports bra if preferred.       Avoid exposing scars to sun for at least 12 months.      Always use a strong sunblock, if sun exposure is unavoidable (SPF 50 or      greater).      Keep the tape on until it starts to curl up on the ends, and then gently remove them.        You may use moisturizing cream at 10 days to help the tape come off. By two weeks,      you may pull off the tape if still in place.      Wear your surgical bra/wrap 24/7 as directed for 4 weeks.      Avoid bras with stays and underwires for 4-6 weeks.      You may pad the incisions with gauze for comfort (panty liners also work well as they        are absorbent and inexpensive).      Inspect daily for signs of infection.      No tub soaking, bathing, or swimming until wounds are healed.      If your breast skin is dry after surgery, you can apply a moisturizer several times a       day.     What to Expect      Despite the three layers of sutures closing your incisions, there will be some oozing       of tissue fluid from them for 2 days or so.  This will soak up on the gauze and the bra       to look like more than it really is.  Report any significant drainage to the clinic.      Most of the higher discomfort will subside after the first few days.      You may experience temporary soreness, bruising, swelling and tightnessin the       breasts as well as discomfort in the incision area.      You may have have normal sensation in the nipples.  This may be more or less than       usual,  "and usually returns over a couple of months.      Your first menstruation following surgery may cause your breasts to swell and hurt.      You may have random shooting pains, tingling, or other strange sensations in the            skin for a few months.  These will subside.    Appearance      Most of the discoloration and swelling will subside in 2-4 weeks.      Your breasts will feel firm to the touch initially, but will soften with time.      A more natural shape will occur as the breasts \"settle\" in a slightly lower position over     the first few months.      Scars may be red and thick for 6-12 months (longer in lighter-skinned patients).  IN          time, these usually soften and fade.    Follow-Up Care      Typically, you will have a post op check at 1-2 weeks, and again with your surgeon in      another month.      If drainage tubes have been used, will be removed when the drainage is less than 30      ml per day for 1-2 days.  This usually happens in 1-3 weeks.    When to Call      If you have increased swelling or bruising, particular one side greater than the other.      If swelling and redness persist after a few days.      If you have increased redness along the incision.      If you have severe or increased pain not relieved by medication.      If you have any side effects to medications; such as, rash, nausea,      headache, vomiting, or constipation.      If you have an oral temperature over 100.4 degrees.      If you have any yellowish or greenish drainage from the incisions or      notice a foul odor.      If you have bleeding from the incisions that is difficult to control with      light pressure.      If you have loss of feeling or motion.      Any unanswered concern.              "

## 2019-05-21 NOTE — TELEPHONE ENCOUNTER
Received phone call from patient stating that she needs to scheduled a pre surgery appointment with Dr. French. I scheduled the appointment with Dr. French, but I addressed it with Dr. French in clinic to clarify. He stated that the patient does not need to come in to clinic to see him, but if she has any questions he would have Maty call the patient to discuss questions over the phone. I relayed the information to the patient who would like a call back from Maty to discuss post op process and wardrobe. Dr. French also asked that the patient come in to have a mammogram done before surgery. We were able to get her scheduled for this Friday, May 24th at 4:00pm in [place].  Evonne Abdul, Surgery Scheduling Coordinator

## 2019-05-22 NOTE — TELEPHONE ENCOUNTER
Pt called and information below reviewed with pt. Pt also advised to go to website below to view breast reconstruction videos versus googling information. Pt advised to call the clinic back if she has any questions or concerns...Maty Ng RN    [email or web address]            BREAST REDUCTION POST-OPERATIVE INSTRUCTIONS    Instructions       Have someone drive you home after surgery and help you at home for 1-2      days.      Get plenty of rest.      Follow balanced diet.      Decreased activity may promote constipation, so you may want to add      more raw fruit to your diet, and be sure to increase fluid intake. Your doctor       may also order a stool softener.      Take pain medication as prescribed. Do not take aspirin or any products      containing aspirin.      Do not drink alcohol when taking pain medications.      Even when not taking pain medications, no alcohol for 3 weeks as it      causes fluid retention.      If you are taking vitamins with iron, resume these as tolerated.      Do not smoke, as smoking delays healing and increases the risk of      complications.    Activities      Do not drive fpr 10 days following your procedure and until you are no longer taking        any pain medications (narcotics).      Do not drive until you have full range of motion with your arms and can stop the car       or swerve in an emergency.      Start walking the evening of surgery, this helps to reduce swelling and       lowers the chance of blood clots.      Refrain from vigorous activities for 2-6 weeks.  Increase activity gradually as tolerated.      After 2 weeks, you may perform lower body exercise, but must wait the full 6 weeks       prior to performing upper body exercise      Avoid lifting anything over 5 pounds for 2 weeks.      Resume social and employment activities in about 2 weeks (if not too strenuous).    Incision Care       You may shower in 48 hours.  If drainage tubes have been used, you may shower       48 hours after removal of the drains. The ACE wrap (if used) may be rewrapped as needed (if too tight or loose). Use it for support and may subtitute with a sports bra if preferred.       Avoid exposing scars to sun for at least 12 months.      Always use a strong sunblock, if sun exposure is unavoidable (SPF 50 or      greater).      Keep the tape on until it starts to curl up on the ends, and then gently remove them.        You may use moisturizing cream at 10 days to help the tape come off. By two weeks,      you may pull off the tape if still in place.      Wear your surgical bra/wrap 24/7 as directed for 4 weeks.      Avoid bras with stays and underwires for 4-6 weeks.      You may pad the incisions with gauze for comfort (panty liners also work well as they        are absorbent and inexpensive).      Inspect daily for signs of infection.      No tub soaking, bathing, or swimming until wounds are healed.      If your breast skin is dry after surgery, you can apply a moisturizer several times a       day.     What to Expect      Despite the three layers of sutures closing your incisions, there will be some oozing       of tissue fluid from them for 2 days or so.  This will soak up on the gauze and the bra       to look like more than it really is.  Report any significant drainage to the clinic.      Most of the higher discomfort will subside after the first few days.      You may experience temporary soreness, bruising, swelling and tightnessin the       breasts as well as discomfort in the incision area.      You may have have normal sensation in the nipples.  This may be more or less than       usual, and usually returns over a couple of months.      Your first menstruation following surgery may cause your breasts to swell and hurt.      You may have random shooting pains, tingling, or other strange sensations in the             "skin for a few months.  These will subside.    Appearance      Most of the discoloration and swelling will subside in 2-4 weeks.      Your breasts will feel firm to the touch initially, but will soften with time.      A more natural shape will occur as the breasts \"settle\" in a slightly lower position over     the first few months.      Scars may be red and thick for 6-12 months (longer in lighter-skinned patients).  IN          time, these usually soften and fade.    Follow-Up Care      Typically, you will have a post op check at 1-2 weeks, and again with your surgeon in      another month.      If drainage tubes have been used, will be removed when the drainage is less than 30      ml per day for 1-2 days.  This usually happens in 1-3 weeks.    When to Call      If you have increased swelling or bruising, particular one side greater than the other.      If swelling and redness persist after a few days.      If you have increased redness along the incision.      If you have severe or increased pain not relieved by medication.      If you have any side effects to medications; such as, rash, nausea,      headache, vomiting, or constipation.      If you have an oral temperature over 100.4 degrees.      If you have any yellowish or greenish drainage from the incisions or      notice a foul odor.      If you have bleeding from the incisions that is difficult to control with      light pressure.      If you have loss of feeling or motion.      Any unanswered concern.    For Medical Questions, Please Call:      [phone], Monday - Friday, 8 a.m. - 4:30 p.m.      After hours and on weekends, call Hospital Paging at [phone] and      ask for the Plastic Surgeon on call.    "

## 2019-05-29 ENCOUNTER — ANCILLARY PROCEDURE (OUTPATIENT)
Dept: MAMMOGRAPHY | Facility: CLINIC | Age: 39
End: 2019-05-29
Attending: PLASTIC SURGERY
Payer: COMMERCIAL

## 2019-05-29 DIAGNOSIS — N62 HYPERTROPHY OF BREAST: ICD-10-CM

## 2019-05-29 PROCEDURE — 77067 SCR MAMMO BI INCL CAD: CPT

## 2019-06-04 ENCOUNTER — OFFICE VISIT (OUTPATIENT)
Dept: FAMILY MEDICINE | Facility: CLINIC | Age: 39
End: 2019-06-04
Payer: COMMERCIAL

## 2019-06-04 VITALS
HEART RATE: 89 BPM | TEMPERATURE: 98.5 F | WEIGHT: 128.9 LBS | HEIGHT: 61 IN | SYSTOLIC BLOOD PRESSURE: 102 MMHG | OXYGEN SATURATION: 100 % | DIASTOLIC BLOOD PRESSURE: 64 MMHG | BODY MASS INDEX: 24.34 KG/M2

## 2019-06-04 DIAGNOSIS — Z01.818 PREOP GENERAL PHYSICAL EXAM: Primary | ICD-10-CM

## 2019-06-04 DIAGNOSIS — N62 HYPERTROPHY OF BREAST: ICD-10-CM

## 2019-06-04 DIAGNOSIS — E03.9 ACQUIRED HYPOTHYROIDISM: ICD-10-CM

## 2019-06-04 DIAGNOSIS — D50.9 IRON DEFICIENCY ANEMIA, UNSPECIFIED IRON DEFICIENCY ANEMIA TYPE: ICD-10-CM

## 2019-06-04 LAB — HGB BLD-MCNC: 11.5 G/DL (ref 11.7–15.7)

## 2019-06-04 PROCEDURE — 99214 OFFICE O/P EST MOD 30 MIN: CPT | Performed by: FAMILY MEDICINE

## 2019-06-04 PROCEDURE — 36415 COLL VENOUS BLD VENIPUNCTURE: CPT | Performed by: FAMILY MEDICINE

## 2019-06-04 PROCEDURE — 85018 HEMOGLOBIN: CPT | Performed by: FAMILY MEDICINE

## 2019-06-04 ASSESSMENT — MIFFLIN-ST. JEOR: SCORE: 1212.07

## 2019-06-04 ASSESSMENT — PAIN SCALES - GENERAL: PAINLEVEL: NO PAIN (0)

## 2019-06-04 NOTE — PROGRESS NOTES
[address] [address]  [phone]  Dept: [phone]    PRE-OP EVALUATION:  Today's date: 2019    Chelsey Bolanos (: 1980) presents for pre-operative evaluation assessment as requested by Dr. French.  She requires evaluation and anesthesia risk assessment prior to undergoing surgery/procedure for treatment of Hypertrophy of Breast    Proposed Surgery/ Procedure: Bilateral Breast Reduction  Date of Surgery/ Procedure: 2019  Time of Surgery/ Procedure: 9:30am  Hospital/Surgical Facility: [facility]  Fax number for surgical facility: [email or web address]  Primary Physician: Penelope Cortes  Type of Anesthesia Anticipated: Combined General with block    Patient has a Health Care Directive or Living Will:  NO    1. NO - Do you have a history of heart attack, stroke, stent, bypass or surgery on an artery in the head, neck, heart or legs?  2. NO - Do you ever have any pain or discomfort in your chest?  3. NO - Do you have a history of  Heart Failure?  4. NO - Are you troubled by shortness of breath when: walking on the level, up a slight hill or at night?  5. NO - Do you currently have a cold, bronchitis or other respiratory infection?  6. NO - Do you have a cough, shortness of breath or wheezing?  7. NO - Do you sometimes get pains in the calves of your legs when you walk?  8. NO - Do you or anyone in your family have previous history of blood clots?  9. NO - Do you or does anyone in your family have a serious bleeding problem such as prolonged bleeding following surgeries or cuts?  10. YES - HAVE YOU EVER HAD PROBLEMS WITH ANEMIA OR BEEN TOLD TO TAKE IRON PILLS? Taking iron currently  11. NO - Have you had any abnormal blood loss such as black, tarry or bloody stools, or abnormal vaginal bleeding?  12. NO - Have you ever had a blood transfusion?  13. NO - Have you or any of your relatives ever had problems with anesthesia?  14. NO - Do you have sleep apnea,  excessive snoring or daytime drowsiness?  15. NO - Do you have any prosthetic heart valves?  16. NO - Do you have prosthetic joints?  17. NO - Is there any chance that you may be pregnant?      HPI:     HPI related to upcoming procedure: 38 year old with history of breast enlargement L>R with left sided chest wall anterior and lateral, breast tenderness, discomfort in shoulder and upper back.  Has had rashes under breasts in past.      See problem list for active medical problems.  Problems all longstanding and stable, except as noted/documented.  See ROS for pertinent symptoms related to these conditions.                                                                                                                                                          .    MEDICAL HISTORY:     Patient Active Problem List    Diagnosis Date Noted     Vitamin D deficiency 12/18/2016     Priority: Medium     Acquired hypothyroidism 12/06/2016     Priority: Medium     Iron deficiency anemia, unspecified iron deficiency anemia type 12/06/2016     Priority: Medium     Anemia 10/16/2013     Priority: Medium     CARDIOVASCULAR SCREENING; LDL GOAL LESS THAN 160 10/15/2013     Priority: Medium     Hypothyroidism 10/15/2013     Priority: Medium      Past Medical History:   Diagnosis Date     History of anemia 10/15/2013     Thyroid disease      Past Surgical History:   Procedure Laterality Date     GYN SURGERY      C section x 2     Current Outpatient Medications   Medication Sig Dispense Refill     Cholecalciferol (VITAMIN D) 2000 UNITS tablet Take 2,000 Units by mouth daily 100 tablet 3     ferrous gluconate (FERGON) 324 (38 FE) MG tablet Take 1 tablet (324 mg) by mouth 2 times daily 180 tablet 3     levothyroxine (SYNTHROID/LEVOTHROID) 100 MCG tablet Take 1 tablet (100 mcg) by mouth daily 90 tablet 3     vitamin C (ASCORBIC ACID) 250 MG tablet Take 250 mg by mouth daily       OTC products: None, except as noted above    No Known  "Allergies   Latex Allergy: NO    Social History     Tobacco Use     Smoking status: Never Smoker     Smokeless tobacco: Never Used   Substance Use Topics     Alcohol use: No     History   Drug Use No       REVIEW OF SYSTEMS:   CONSTITUTIONAL: NEGATIVE for fever, chills, change in weight  INTEGUMENTARY/SKIN: NEGATIVE for worrisome rashes, moles or lesions  EYES: NEGATIVE for vision changes or irritation  ENT/MOUTH: NEGATIVE for ear, mouth and throat problems  RESP: NEGATIVE for significant cough or SOB  BREAST: see HPI  CV: NEGATIVE for chest pain, palpitations or peripheral edema  GI: NEGATIVE for nausea, abdominal pain, heartburn, or change in bowel habits  : NEGATIVE for frequency, dysuria, or hematuria  MUSCULOSKELETAL: NEGATIVE for significant arthralgias or myalgia  NEURO: NEGATIVE for weakness, dizziness or paresthesias  ENDOCRINE: NEGATIVE for temperature intolerance, skin/hair changes  HEME: NEGATIVE for bleeding problems  PSYCHIATRIC: NEGATIVE for changes in mood or affect    EXAM:   /64 (BP Location: Right arm, Patient Position: Chair, Cuff Size: Adult Regular)   Pulse 89   Temp 98.5  F (36.9  C) (Tympanic)   Ht 1.549 m (5' 1\")   Wt 58.5 kg (128 lb 14.4 oz)   LMP 05/19/2019 (Exact Date)   SpO2 100%   Breastfeeding? No   BMI 24.36 kg/m      GENERAL APPEARANCE: healthy, alert and no distress     EYES: EOMI, PERRL     HENT: ear canals and TM's normal and nose and mouth without ulcers or lesions     NECK: no adenopathy, no asymmetry, masses, or scars and thyroid normal to palpation     RESP: lungs clear to auscultation - no rales, rhonchi or wheezes     CV: regular rates and rhythm, normal S1 S2, no S3 or S4 and no murmur, click or rub     ABDOMEN:  soft, nontender, no HSM or masses and bowel sounds normal     MS: extremities normal- no gross deformities noted, no evidence of inflammation in joints, FROM in all extremities.     SKIN: no suspicious lesions or rashes     NEURO: Normal strength " and tone, sensory exam grossly normal, mentation intact and speech normal     PSYCH: mentation appears normal. and affect normal/bright     LYMPHATICS: No cervical adenopathy    DIAGNOSTICS:     EKG: Not indicated due to non-vascular surgery and low risk of event (age <65 and without cardiac risk factors)  Labs Resulted Today:   Results for orders placed or performed in visit on 06/04/19   Hemoglobin   Result Value Ref Range    Hemoglobin 11.5 (L) 11.7 - 15.7 g/dL       Recent Labs   Lab Test 02/12/19  0954 09/18/18  1730   HGB 12.4 11.5*    269   NA  --  142   POTASSIUM  --  3.9   CR  --  0.56        IMPRESSION:   Reason for surgery/procedure: Breast hypertrophy // Bilateral breast reduction  Diagnosis/reason for consult: surgical clearance    The proposed surgical procedure is considered INTERMEDIATE risk.    REVISED CARDIAC RISK INDEX  The patient has the following serious cardiovascular risks for perioperative complications such as (MI, PE, VFib and 3  AV Block):  No serious cardiac risks  INTERPRETATION: 0 risks: Class I (very low risk - 0.4% complication rate)    The patient has the following additional risks for perioperative complications:  No identified additional risks      ICD-10-CM    1. Preop general physical exam Z01.818    2. Hypertrophy of breast N62    3. Iron deficiency anemia, unspecified iron deficiency anemia type D50.9 Hemoglobin   4. Acquired hypothyroidism E03.9        RECOMMENDATIONS:         --Patient is to take all scheduled medications on the day of surgery EXCEPT for modifications listed below.  Hold iron supplement and vitamins on the AM of surgery.      APPROVAL GIVEN to proceed with proposed procedure, without further diagnostic evaluation       Signed Electronically by: Penelope Cortes MD    Copy of this evaluation report is provided to requesting physician.    [facility] Preop Guidelines    Revised Cardiac Risk Index

## 2019-06-04 NOTE — PATIENT INSTRUCTIONS
Proceed with surgery as planned.    Take thyroid medication in AM with small amount of water.  Other medications can be held until you return home after surgery.      Before Your Surgery      Call your surgeon if there is any change in your health. This includes signs of a cold or flu (such as a sore throat, runny nose, cough, rash or fever).    Do not smoke, drink alcohol or take over the counter medicine (unless your surgeon or primary care doctor tells you to) for the 24 hours before and after surgery.    If you take prescribed drugs: Follow your doctor s orders about which medicines to take and which to stop until after surgery.    Eating and drinking prior to surgery: follow the instructions from your surgeon    Take a shower or bath the night before surgery. Use the soap your surgeon gave you to gently clean your skin. If you do not have soap from your surgeon, use your regular soap. Do not shave or scrub the surgery site.  Wear clean pajamas and have clean sheets on your bed.

## 2019-06-06 ENCOUNTER — ANESTHESIA EVENT (OUTPATIENT)
Dept: SURGERY | Facility: AMBULATORY SURGERY CENTER | Age: 39
End: 2019-06-06

## 2019-06-06 NOTE — ANESTHESIA PREPROCEDURE EVALUATION
Anesthesia Pre-Procedure Evaluation    Patient: Chelsey Bolanos   MRN:     [id number] Gender:   female   Age:    38 year old :      1980        Preoperative Diagnosis: Breast hypertrophy   Procedure(s):  MAMMOPLASTY, REDUCTION, BILATERAL     Past Medical History:   Diagnosis Date     History of anemia 10/15/2013     Thyroid disease       Past Surgical History:   Procedure Laterality Date     GYN SURGERY      C section x 2          Anesthesia Evaluation     .             ROS/MED HX    ENT/Pulmonary:  - neg pulmonary ROS     Neurologic:  - neg neurologic ROS     Cardiovascular:  - neg cardiovascular ROS       METS/Exercise Tolerance:  >4 METS   Hematologic:     (+) Anemia, -      Musculoskeletal:  - neg musculoskeletal ROS       GI/Hepatic:  - neg GI/hepatic ROS       Renal/Genitourinary:  - ROS Renal section negative       Endo:     (+) thyroid problem hypothyroidism, .      Psychiatric:  - neg psychiatric ROS       Infectious Disease:  - neg infectious disease ROS       Malignancy:      - no malignancy   Other:    - neg other ROS                     PHYSICAL EXAM:   Mental Status/Neuro: A/A/O   Airway: Facies: Feasible  Mallampati: I  Mouth/Opening: Full  TM distance: > 6 cm  Neck ROM: Full   Respiratory: Auscultation: CTAB     Resp. Rate: Normal     Resp. Effort: Normal      CV: Rhythm: Regular  Rate: Age appropriate  Heart: Normal Sounds   Comments:      Dental: Normal                  Lab Results   Component Value Date    WBC 6.7 2019    HGB 11.5 (L) 2019    HCT 37.3 2019     2019     2018    POTASSIUM 3.9 2018    CHLORIDE 107 2018    CO2 26 2018    BUN 12 2018    CR 0.56 2018    GLC 99 2018    KRISTIAN 8.7 2018    ALBUMIN 3.6 2018    PROTTOTAL 7.2 2018    ALT 18 2018    AST 15 2018    ALKPHOS 80 2018    BILITOTAL 0.4 2018    TSH 1.94 2018    T4 1.31 2015       Preop Vitals  BP  "Readings from Last 3 Encounters:   06/04/19 102/64   02/12/19 111/77   09/27/18 (!) 102/38    Pulse Readings from Last 3 Encounters:   06/04/19 89   02/12/19 69   09/27/18 72      Resp Readings from Last 3 Encounters:   02/12/19 18   09/27/18 16   09/06/17 16    SpO2 Readings from Last 3 Encounters:   06/04/19 100%   02/12/19 100%   09/27/18 98%      Temp Readings from Last 1 Encounters:   06/04/19 36.9  C (98.5  F) (Tympanic)    Ht Readings from Last 1 Encounters:   06/04/19 1.549 m (5' 1\")      Wt Readings from Last 1 Encounters:   06/04/19 58.5 kg (128 lb 14.4 oz)    Estimated body mass index is 24.36 kg/m  as calculated from the following:    Height as of 6/4/19: 1.549 m (5' 1\").    Weight as of 6/4/19: 58.5 kg (128 lb 14.4 oz).     LDA:            Assessment:   ASA SCORE: 2    NPO Status: > 2 hours since completed Clear Liquids; > 6 hours since completed Solid Foods   Documentation: H&P complete; Preop Testing complete; Consents complete   Proceeding: Proceed without further delay  Tobacco Use:  NO Active use of Tobacco/UNKNOWN Tobacco use status     Plan:   Anes. Type:  General; Regional     RA Details:  Pre Induction; SS; Exparel     RA Location: Bilateral Pec block.   Pre-Induction: Midazolam IV; Opioid IV; Acetaminophen PO; Gabapentin PO   Induction:  IV (Standard)   Airway: LMA   Access/Monitoring: PIV   Maintenance: Balanced   Emergence: Procedure Site   Logistics: Same Day Surgery     Postop Pain/Sedation Strategy:  Standard-Options: Opioids PRN; Block SS; Exparel     PONV Management:  Adult Risk Factors: Female, Non-Smoker, Postop Opioids  Prevention: Ondansetron; Propofol Infusion; Dexamethasone     CONSENT: Direct conversation   Plan and risks discussed with: Patient   Blood Products: Consented (ALL Blood Products)                         Arsh Whitfield MD  "

## 2019-06-07 ENCOUNTER — ANESTHESIA (OUTPATIENT)
Dept: SURGERY | Facility: AMBULATORY SURGERY CENTER | Age: 39
End: 2019-06-07
Payer: COMMERCIAL

## 2019-06-07 ENCOUNTER — SURGERY (OUTPATIENT)
Age: 39
End: 2019-06-07
Payer: COMMERCIAL

## 2019-06-07 ENCOUNTER — HOSPITAL ENCOUNTER (OUTPATIENT)
Facility: AMBULATORY SURGERY CENTER | Age: 39
Discharge: HOME | End: 2019-06-07
Attending: PLASTIC SURGERY | Admitting: PLASTIC SURGERY
Payer: COMMERCIAL

## 2019-06-07 VITALS
RESPIRATION RATE: 12 BRPM | SYSTOLIC BLOOD PRESSURE: 98 MMHG | HEART RATE: 81 BPM | DIASTOLIC BLOOD PRESSURE: 70 MMHG | TEMPERATURE: 98 F | OXYGEN SATURATION: 100 %

## 2019-06-07 DIAGNOSIS — Z98.890 S/P BILATERAL BREAST REDUCTION: Primary | ICD-10-CM

## 2019-06-07 LAB — HCG UR QL: NEGATIVE

## 2019-06-07 PROCEDURE — 19318 BREAST REDUCTION: CPT | Mod: 50

## 2019-06-07 PROCEDURE — 19318 BREAST REDUCTION: CPT | Mod: 50 | Performed by: PLASTIC SURGERY

## 2019-06-07 PROCEDURE — G8916 PT W IV AB GIVEN ON TIME: HCPCS

## 2019-06-07 PROCEDURE — G8907 PT DOC NO EVENTS ON DISCHARG: HCPCS

## 2019-06-07 PROCEDURE — 88305 TISSUE EXAM BY PATHOLOGIST: CPT | Performed by: PLASTIC SURGERY

## 2019-06-07 PROCEDURE — 81025 URINE PREGNANCY TEST: CPT | Performed by: ANESTHESIOLOGY

## 2019-06-07 RX ORDER — SODIUM CHLORIDE, SODIUM LACTATE, POTASSIUM CHLORIDE, CALCIUM CHLORIDE 600; 310; 30; 20 MG/100ML; MG/100ML; MG/100ML; MG/100ML
INJECTION, SOLUTION INTRAVENOUS CONTINUOUS
Status: DISCONTINUED | OUTPATIENT
Start: 2019-06-07 | End: 2019-06-08 | Stop reason: HOSPADM

## 2019-06-07 RX ORDER — FENTANYL CITRATE 50 UG/ML
INJECTION, SOLUTION INTRAMUSCULAR; INTRAVENOUS PRN
Status: DISCONTINUED | OUTPATIENT
Start: 2019-06-07 | End: 2019-06-07

## 2019-06-07 RX ORDER — ONDANSETRON 2 MG/ML
4 INJECTION INTRAMUSCULAR; INTRAVENOUS EVERY 30 MIN PRN
Status: DISCONTINUED | OUTPATIENT
Start: 2019-06-07 | End: 2019-06-08 | Stop reason: HOSPADM

## 2019-06-07 RX ORDER — ACETAMINOPHEN 325 MG/1
975 TABLET ORAL ONCE
Status: COMPLETED | OUTPATIENT
Start: 2019-06-07 | End: 2019-06-07

## 2019-06-07 RX ORDER — AMOXICILLIN 250 MG
1-2 CAPSULE ORAL 2 TIMES DAILY
Qty: 30 TABLET | Refills: 0 | Status: SHIPPED | OUTPATIENT
Start: 2019-06-07 | End: 2019-10-28

## 2019-06-07 RX ORDER — SCOLOPAMINE TRANSDERMAL SYSTEM 1 MG/1
1 PATCH, EXTENDED RELEASE TRANSDERMAL
Status: DISCONTINUED | OUTPATIENT
Start: 2019-06-07 | End: 2019-06-08 | Stop reason: HOSPADM

## 2019-06-07 RX ORDER — PROPOFOL 10 MG/ML
INJECTION, EMULSION INTRAVENOUS CONTINUOUS PRN
Status: DISCONTINUED | OUTPATIENT
Start: 2019-06-07 | End: 2019-06-07

## 2019-06-07 RX ORDER — HYDROMORPHONE HYDROCHLORIDE 1 MG/ML
.3-.5 INJECTION, SOLUTION INTRAMUSCULAR; INTRAVENOUS; SUBCUTANEOUS EVERY 10 MIN PRN
Status: DISCONTINUED | OUTPATIENT
Start: 2019-06-07 | End: 2019-06-08 | Stop reason: HOSPADM

## 2019-06-07 RX ORDER — OXYCODONE HYDROCHLORIDE 5 MG/1
5-10 TABLET ORAL EVERY 4 HOURS PRN
Status: DISCONTINUED | OUTPATIENT
Start: 2019-06-07 | End: 2019-06-08 | Stop reason: HOSPADM

## 2019-06-07 RX ORDER — DEXAMETHASONE SODIUM PHOSPHATE 4 MG/ML
4 INJECTION, SOLUTION INTRA-ARTICULAR; INTRALESIONAL; INTRAMUSCULAR; INTRAVENOUS; SOFT TISSUE EVERY 10 MIN PRN
Status: DISCONTINUED | OUTPATIENT
Start: 2019-06-07 | End: 2019-06-08 | Stop reason: HOSPADM

## 2019-06-07 RX ORDER — FLUMAZENIL 0.1 MG/ML
0.2 INJECTION, SOLUTION INTRAVENOUS
Status: DISCONTINUED | OUTPATIENT
Start: 2019-06-07 | End: 2019-06-08 | Stop reason: HOSPADM

## 2019-06-07 RX ORDER — OXYCODONE HYDROCHLORIDE 5 MG/1
5-10 TABLET ORAL EVERY 6 HOURS PRN
Qty: 25 TABLET | Refills: 0 | Status: SHIPPED | OUTPATIENT
Start: 2019-06-07 | End: 2019-10-28

## 2019-06-07 RX ORDER — NALOXONE HYDROCHLORIDE 0.4 MG/ML
.1-.4 INJECTION, SOLUTION INTRAMUSCULAR; INTRAVENOUS; SUBCUTANEOUS
Status: DISCONTINUED | OUTPATIENT
Start: 2019-06-07 | End: 2019-06-08 | Stop reason: HOSPADM

## 2019-06-07 RX ORDER — CEFAZOLIN SODIUM 1 G/3ML
1 INJECTION, POWDER, FOR SOLUTION INTRAMUSCULAR; INTRAVENOUS SEE ADMIN INSTRUCTIONS
Status: DISCONTINUED | OUTPATIENT
Start: 2019-06-07 | End: 2019-06-08 | Stop reason: HOSPADM

## 2019-06-07 RX ORDER — ONDANSETRON 2 MG/ML
INJECTION INTRAMUSCULAR; INTRAVENOUS PRN
Status: DISCONTINUED | OUTPATIENT
Start: 2019-06-07 | End: 2019-06-07

## 2019-06-07 RX ORDER — FENTANYL CITRATE 50 UG/ML
25-50 INJECTION, SOLUTION INTRAMUSCULAR; INTRAVENOUS
Status: DISCONTINUED | OUTPATIENT
Start: 2019-06-07 | End: 2019-06-08 | Stop reason: HOSPADM

## 2019-06-07 RX ORDER — CEFAZOLIN SODIUM 1 G/3ML
INJECTION, POWDER, FOR SOLUTION INTRAMUSCULAR; INTRAVENOUS PRN
Status: DISCONTINUED | OUTPATIENT
Start: 2019-06-07 | End: 2019-06-07

## 2019-06-07 RX ORDER — LIDOCAINE 40 MG/G
CREAM TOPICAL
Status: DISCONTINUED | OUTPATIENT
Start: 2019-06-07 | End: 2019-06-08 | Stop reason: HOSPADM

## 2019-06-07 RX ORDER — GABAPENTIN 300 MG/1
300 CAPSULE ORAL ONCE
Status: COMPLETED | OUTPATIENT
Start: 2019-06-07 | End: 2019-06-07

## 2019-06-07 RX ORDER — ONDANSETRON 4 MG/1
4 TABLET, ORALLY DISINTEGRATING ORAL EVERY 30 MIN PRN
Status: DISCONTINUED | OUTPATIENT
Start: 2019-06-07 | End: 2019-06-08 | Stop reason: HOSPADM

## 2019-06-07 RX ORDER — CEFAZOLIN SODIUM 2 G/100ML
2 INJECTION, SOLUTION INTRAVENOUS
Status: COMPLETED | OUTPATIENT
Start: 2019-06-07 | End: 2019-06-07

## 2019-06-07 RX ORDER — DEXAMETHASONE SODIUM PHOSPHATE 4 MG/ML
INJECTION, SOLUTION INTRA-ARTICULAR; INTRALESIONAL; INTRAMUSCULAR; INTRAVENOUS; SOFT TISSUE PRN
Status: DISCONTINUED | OUTPATIENT
Start: 2019-06-07 | End: 2019-06-07

## 2019-06-07 RX ORDER — ALBUTEROL SULFATE 0.83 MG/ML
2.5 SOLUTION RESPIRATORY (INHALATION) EVERY 4 HOURS PRN
Status: DISCONTINUED | OUTPATIENT
Start: 2019-06-07 | End: 2019-06-08 | Stop reason: HOSPADM

## 2019-06-07 RX ORDER — BUPIVACAINE HYDROCHLORIDE 2.5 MG/ML
INJECTION, SOLUTION EPIDURAL; INFILTRATION; INTRACAUDAL PRN
Status: DISCONTINUED | OUTPATIENT
Start: 2019-06-07 | End: 2019-06-07

## 2019-06-07 RX ORDER — PROPOFOL 10 MG/ML
INJECTION, EMULSION INTRAVENOUS PRN
Status: DISCONTINUED | OUTPATIENT
Start: 2019-06-07 | End: 2019-06-07

## 2019-06-07 RX ORDER — FENTANYL CITRATE 50 UG/ML
50 INJECTION, SOLUTION INTRAMUSCULAR; INTRAVENOUS ONCE
Status: COMPLETED | OUTPATIENT
Start: 2019-06-07 | End: 2019-06-07

## 2019-06-07 RX ORDER — ONDANSETRON 4 MG/1
4-8 TABLET, ORALLY DISINTEGRATING ORAL EVERY 8 HOURS PRN
Qty: 4 TABLET | Refills: 0 | Status: SHIPPED | OUTPATIENT
Start: 2019-06-07 | End: 2019-10-28

## 2019-06-07 RX ORDER — LIDOCAINE HYDROCHLORIDE 20 MG/ML
INJECTION, SOLUTION INFILTRATION; PERINEURAL PRN
Status: DISCONTINUED | OUTPATIENT
Start: 2019-06-07 | End: 2019-06-07

## 2019-06-07 RX ORDER — KETOROLAC TROMETHAMINE 30 MG/ML
30 INJECTION, SOLUTION INTRAMUSCULAR; INTRAVENOUS EVERY 6 HOURS PRN
Status: DISCONTINUED | OUTPATIENT
Start: 2019-06-07 | End: 2019-06-08 | Stop reason: HOSPADM

## 2019-06-07 RX ORDER — MEPERIDINE HYDROCHLORIDE 25 MG/ML
12.5 INJECTION INTRAMUSCULAR; INTRAVENOUS; SUBCUTANEOUS
Status: DISCONTINUED | OUTPATIENT
Start: 2019-06-07 | End: 2019-06-08 | Stop reason: HOSPADM

## 2019-06-07 RX ADMIN — OXYCODONE HYDROCHLORIDE 5 MG: 5 TABLET ORAL at 12:10

## 2019-06-07 RX ADMIN — SCOLOPAMINE TRANSDERMAL SYSTEM 1 PATCH: 1 PATCH, EXTENDED RELEASE TRANSDERMAL at 08:45

## 2019-06-07 RX ADMIN — GABAPENTIN 300 MG: 300 CAPSULE ORAL at 08:57

## 2019-06-07 RX ADMIN — FENTANYL CITRATE 50 MCG: 50 INJECTION, SOLUTION INTRAMUSCULAR; INTRAVENOUS at 09:07

## 2019-06-07 RX ADMIN — DEXAMETHASONE SODIUM PHOSPHATE 8 MG: 4 INJECTION, SOLUTION INTRA-ARTICULAR; INTRALESIONAL; INTRAMUSCULAR; INTRAVENOUS; SOFT TISSUE at 09:53

## 2019-06-07 RX ADMIN — PROPOFOL 225 MCG/KG/MIN: 10 INJECTION, EMULSION INTRAVENOUS at 09:38

## 2019-06-07 RX ADMIN — FENTANYL CITRATE 50 MCG: 50 INJECTION, SOLUTION INTRAMUSCULAR; INTRAVENOUS at 12:14

## 2019-06-07 RX ADMIN — FENTANYL CITRATE 50 MCG: 50 INJECTION, SOLUTION INTRAMUSCULAR; INTRAVENOUS at 12:44

## 2019-06-07 RX ADMIN — FENTANYL CITRATE 50 MCG: 50 INJECTION, SOLUTION INTRAMUSCULAR; INTRAVENOUS at 09:37

## 2019-06-07 RX ADMIN — CEFAZOLIN SODIUM 2 G: 2 INJECTION, SOLUTION INTRAVENOUS at 09:32

## 2019-06-07 RX ADMIN — BUPIVACAINE HYDROCHLORIDE 20 ML: 2.5 INJECTION, SOLUTION EPIDURAL; INFILTRATION; INTRACAUDAL at 09:15

## 2019-06-07 RX ADMIN — LIDOCAINE HYDROCHLORIDE 3 ML: 20 INJECTION, SOLUTION INFILTRATION; PERINEURAL at 09:37

## 2019-06-07 RX ADMIN — FENTANYL CITRATE 25 MCG: 50 INJECTION, SOLUTION INTRAMUSCULAR; INTRAVENOUS at 10:26

## 2019-06-07 RX ADMIN — ONDANSETRON 4 MG: 2 INJECTION INTRAMUSCULAR; INTRAVENOUS at 11:09

## 2019-06-07 RX ADMIN — CEFAZOLIN SODIUM 1 G: 1 INJECTION, POWDER, FOR SOLUTION INTRAMUSCULAR; INTRAVENOUS at 11:30

## 2019-06-07 RX ADMIN — SODIUM CHLORIDE, SODIUM LACTATE, POTASSIUM CHLORIDE, CALCIUM CHLORIDE: 600; 310; 30; 20 INJECTION, SOLUTION INTRAVENOUS at 08:40

## 2019-06-07 RX ADMIN — ACETAMINOPHEN 975 MG: 325 TABLET ORAL at 08:57

## 2019-06-07 RX ADMIN — FENTANYL CITRATE 25 MCG: 50 INJECTION, SOLUTION INTRAMUSCULAR; INTRAVENOUS at 10:21

## 2019-06-07 RX ADMIN — PROPOFOL 170 MG: 10 INJECTION, EMULSION INTRAVENOUS at 09:37

## 2019-06-07 NOTE — OR NURSING
Pt received bilat Pectoralis block in PreOP from Dr. Grunner. VSS and O2 sats stable during block. Tolerated procedure well.   Pt's  came in to see pt in PreOp prior to starting block.  He will stay with their young children in waiting room.

## 2019-06-07 NOTE — ANESTHESIA PROCEDURE NOTES
Peripheral Nerve Block Procedure Note    Staff:     Anesthesiologist:  Arsh Whitfield MD  Location: Pre-op  Procedure Start/Stop TImes:      6/7/2019 9:05 AM     6/7/2019 9:18 AM    patient identified, IV checked, site marked, risks and benefits discussed, informed consent, monitors and equipment checked, pre-op evaluation, at physician/surgeon's request and post-op pain management      Correct Patient: Yes      Correct Position: Yes      Correct Site: Yes      Correct Procedure: Yes      Correct Laterality:  Yes    Site Marked:  Yes  Procedure details:     Procedure:  Pectoralis    ASA:  1    Laterality:  Bilateral    Position:  Supine    Sterile Prep: chloraprep, mask and sterile gloves      Local skin infiltration:  2% lidocaine    amount (mL):  5    Needle:  Short bevel    Needle gauge:  21    Needle length (mm):  110    Ultrasound: Yes      Ultrasound used to identify targeted nerve, plexus, or vascular structure and placed a needle adjacent to it      Permanent Image entered into patiient's record      Abnormal pain on injection: No      Blood Aspirated: No      Paresthesias:  No    Bleeding at site: No      Bolus via:  Needle    Infusion Method:  Single Shot  Assessment/Narrative:     Injection made incrementally with aspirations every (mL):  5     Bilateral Pec blocks 1 and 2

## 2019-06-07 NOTE — ANESTHESIA POSTPROCEDURE EVALUATION
Anesthesia POST Procedure Evaluation    Patient: Chelsey Bolanos   MRN:     [id number] Gender:   female   Age:    38 year old :      1980        Preoperative Diagnosis: Breast hypertrophy   Procedure(s):  MAMMOPLASTY, REDUCTION, BILATERAL   Postop Comments: No value filed.       Anesthesia Type:  General, Regional  General, Peripheral Nerve Block for post-op pain at surgeon's request    Reportable Event: NO     PAIN: Uncomplicated   Sign Out status: Comfortable, Well controlled pain     PONV: No PONV   Sign Out status:  No Nausea or Vomiting     Neuro/Psych: Uneventful perioperative course   Sign Out Status: Preoperative baseline; Age appropriate mentation     Airway/Resp.: Uneventful perioperative course   Sign Out Status: Non labored breathing, age appropriate RR; Resp. Status within EXPECTED Parameters     CV: Uneventful perioperative course   Sign Out status: Appropriate BP and perfusion indices; Appropriate HR/Rhythm     Disposition:   Sign Out in:  PACU  Disposition:  Phase II; Home  Recovery Course: Uneventful  Follow-Up: Not required     Comments/Narrative:  Patient doing well post-operatively.  No significant issues.  Hemodynamically stable, pain well controlled, nausea well controlled.  Stable for discharge from the PACU             Last Anesthesia Record Vitals:  CRNA VITALS  2019 1125 - 2019 1225      2019             Pulse:  92    SpO2:  100 %          Last PACU Vitals:  Vitals Value Taken Time   BP 96/65 2019 12:25 PM   Temp     Pulse 84 2019 12:25 PM   Resp 7 2019 12:26 PM   SpO2 98 % 2019 12:26 PM   Temp src     NIBP     Pulse     SpO2     Resp     Temp     Ht Rate     Temp 2     Vitals shown include unvalidated device data.      Electronically Signed By: Arsh Whitfield MD, 2019, 2:13 PM

## 2019-06-07 NOTE — OP NOTE
PREOPERATIVE DIAGNOSIS: Symptomatic bilateral breast hypertrophy.     POSTOPERATIVE DIAGNOSIS: Symptomatic bilateral breast hypertrophy.     PROCEDURES: Bilateral superomedial pedicle inverted T skin closure breast reduction.     SURGEON: Bryon French MD.     FELLOW: Jaiden Olivia MD.    ANESTHESIA: General anesthesia with endotracheal intubation.     COMPLICATIONS: Nil.     DRAINS: Nil.     Blood Loss: 150 mL    SPECIMENS: Skin and breast tissue from right breast measuring about 450 g, left breast measuring about 600 g.     DESCRIPTION OF PROCEDURE: After informed consent was taken, the proper site and procedure was ascertained with the patient and was appropriately marked and taken to in the operating room.  She was placed in supine position with the knees comfortably flexed with pillows underneath them, and pneumoboots placed and running prior to induction of anesthesia. Preoperative antibiotics given in the OR. All pressure points were appropriately padded. General anesthesia was administered without any complications. She was placed in such a position that she could be flexed to about 50 degrees. Her arms were padded and abducted to about 50 degrees. She was prepped and draped in a standard surgical fashion. I began by first remarking the preop markings and marking a 42 mm areola on each side. I then marked out a superior medial pedicle on each side. I then de-epithelialized the pedicle on each side. I then dissected out the pedicle on each side without actually seeing the deep fascia and also released the pedicle such that it could rotate into its new nipple position without any tension. I then went ahead and on each side excised the inferomedial, inferior, inferolateral and lateral aspects of the breast according to a vertical pattern reduction. Strict hemostasis was ensured during this entire part of the case. Once this was done, I then temporarily closed the patient's breast in an inverted T fashion, sat  the patient up ensured symmetry and then marked out the new areolar opening symmetrically on each side. I then went ahead and closed the horizontal incision using 2-0 Monocryl suture in a deep dermal layer. Then I made sure that the nipple areolar complex could be retrieved without any tension, which is could on each side. I then checked that they were both viable, which they were. I then went ahead and excised the skin of the new areolar opening and de-epithelialized epithelialized the portion that was involved in the pedicle on each side. I then sutured in the nipple areolar complex using 2-0 Monocryl suture in a deep dermal fashion circumferentially. I then closed the vertical incision using 2-0 Monocryl suture to approximate the medial and lateral pillars, and then the deep dermis. I then ran all the incisions with 3-0 Strattafix suture in a running intracuticular manner followed by placement of Prineo, and then followed by an ACE wrap. At the end of the case, the patient's breasts were soft, nipples were viable, and breasts were symmetric. The patient tolerated the procedure well. All counts correct at the end of the case. The patient was extubated and sent to recovery room in a stable condition.

## 2019-06-07 NOTE — ANESTHESIA CARE TRANSFER NOTE
Patient: Chelsey Bolanos    Procedure(s):  MAMMOPLASTY, REDUCTION, BILATERAL    Diagnosis: Breast hypertrophy  Diagnosis Additional Information: No value filed.    Anesthesia Type:   General, Peripheral Nerve Block for post-op pain at surgeon's request     Note:  Airway :Nasal Cannula  Patient transferred to:PACU  Comments: Prior to atraumatic LMA removal, patient awake, good aeration, SpO2 100%, equal bilateral breath sounds.  Transported to PACU on oxygen 3 lpm.  Patient awake, alert, SpO2 98% in PACU. No apparent anesthesia complications.   Handoff Report: Identifed the Patient, Identified the Reponsible Provider, Reviewed the pertinent medical history, Discussed the surgical course, Reviewed Intra-OP anesthesia mangement and issues during anesthesia, Set expectations for post-procedure period and Allowed opportunity for questions and acknowledgement of understanding      Vitals: (Last set prior to Anesthesia Care Transfer)    CRNA VITALS  6/7/2019 1125 - 6/7/2019 1202      6/7/2019             Pulse:  92    SpO2:  100 %                Electronically Signed By: BELÉN Phillips CRNA  June 7, 2019  12:02 PM

## 2019-06-07 NOTE — DISCHARGE INSTRUCTIONS
BREAST REDUCTION POST-OPERATIVE INSTRUCTIONS    Instructions       Have someone drive you home after surgery and help you at home for 1-2      days.      Get plenty of rest.      Follow balanced diet.      Decreased activity may promote constipation, so you may want to add      more raw fruit to your diet, and be sure to increase fluid intake. Your doctor       may also order a stool softener.      Take pain medication as prescribed. Do not take aspirin or any products      containing aspirin.      Do not drink alcohol when taking pain medications.      Even when not taking pain medications, no alcohol for 3 weeks as it      causes fluid retention.      If you are taking vitamins with iron, resume these as tolerated.      Do not smoke, as smoking delays healing and increases the risk of      complications.    Activities      Do not drive fpr 10 days following your procedure and until you are no longer taking        any pain medications (narcotics).      Do not drive until you have full range of motion with your arms and can stop the car       or swerve in an emergency.      Start walking the evening of surgery, this helps to reduce swelling and       lowers the chance of blood clots.      Refrain from vigorous activities for 2-6 weeks.  Increase activity gradually as tolerated.      After 2 weeks, you may perform lower body exercise, but must wait the full 6 weeks       prior to performing upper body exercise      Avoid lifting anything over 5 pounds for 2 weeks.      Resume social and employment activities in about 2 weeks (if not too strenuous).    Incision Care      You may shower in 48 hours.  If drainage tubes have been used, you may shower       48 hours after removal of the drains. The ACE wrap (if used) may be rewrapped as needed (if too tight or loose). Use it for support and may subtitute with a sports bra if preferred.       Avoid exposing scars to sun for at least 12 months.      Always use a strong  "sunblock, if sun exposure is unavoidable (SPF 50 or      greater).      Keep the tape on until it starts to curl up on the ends, and then gently remove them.        You may use moisturizing cream at 10 days to help the tape come off. By two weeks,      you may pull off the tape if still in place.      Wear your surgical bra/wrap 24/7 as directed for 4 weeks.      Avoid bras with stays and underwires for 4-6 weeks.      You may pad the incisions with gauze for comfort (panty liners also work well as they        are absorbent and inexpensive).      Inspect daily for signs of infection.      No tub soaking, bathing, or swimming until wounds are healed.      If your breast skin is dry after surgery, you can apply a moisturizer several times a       day.     What to Expect      Despite the three layers of sutures closing your incisions, there will be some oozing       of tissue fluid from them for 2 days or so.  This will soak up on the gauze and the bra       to look like more than it really is.  Report any significant drainage to the clinic.      Most of the higher discomfort will subside after the first few days.      You may experience temporary soreness, bruising, swelling and tightnessin the       breasts as well as discomfort in the incision area.      You may have have normal sensation in the nipples.  This may be more or less than       usual, and usually returns over a couple of months.      Your first menstruation following surgery may cause your breasts to swell and hurt.      You may have random shooting pains, tingling, or other strange sensations in the            skin for a few months.  These will subside.    Appearance      Most of the discoloration and swelling will subside in 2-4 weeks.      Your breasts will feel firm to the touch initially, but will soften with time.      A more natural shape will occur as the breasts \"settle\" in a slightly lower position over     the first few months.      Scars may " be red and thick for 6-12 months (longer in lighter-skinned patients).  IN          time, these usually soften and fade.    Follow-Up Care      Typically, you will have a post op check at 1-2 weeks, and again with your surgeon in      another month.      If drainage tubes have been used, will be removed when the drainage is less than 30      ml per day for 1-2 days.  This usually happens in 1-3 weeks.    When to Call      If you have increased swelling or bruising, particular one side greater than the other.      If swelling and redness persist after a few days.      If you have increased redness along the incision.      If you have severe or increased pain not relieved by medication.      If you have any side effects to medications; such as, rash, nausea,      headache, vomiting, or constipation.      If you have an oral temperature over 100.4 degrees.      If you have any yellowish or greenish drainage from the incisions or      notice a foul odor.      If you have bleeding from the incisions that is difficult to control with      light pressure.      If you have loss of feeling or motion.      Any unanswered concern.    For Medical Questions, Please Call:      [phone], Monday - Friday, 8 a.m. - 4:30 p.m.      After hours and on weekends, call Hospital Paging at [phone] and      ask for the Plastic Surgeon on call.      [facility]  Same-Day Surgery   Adult Discharge Orders & Instructions   For 24 hours after surgery  1. Get plenty of rest.  A responsible adult must stay with you for at least 24 hours after you leave the hospital.   2. Do not drive or use heavy equipment.  If you have weakness or tingling, don't drive or use heavy equipment until this feeling goes away.  3. Do not drink alcohol.  4. Avoid strenuous or risky activities.  Ask for help when climbing stairs.   5. You may feel lightheaded.  IF so, sit for a few minutes before standing.  Have someone help you get up.    6. If you have nausea (feel sick to your stomach): Drink only clear liquids such as apple juice, ginger ale, broth or 7-Up.  Rest may also help.  Be sure to drink enough fluids.  Move to a regular diet as you feel able.  7. You may have a slight fever. Call the doctor if your fever is over 100 F (37.7 C) (taken under the tongue) or lasts longer than 24 hours.  8. You may have a dry mouth, a sore throat, muscle aches or trouble sleeping.  These should go away after 24 hours.  9. Do not make important or legal decisions.   Call your doctor for any of the followin.  Signs of infection (fever, growing tenderness at the surgery site, a large amount of drainage or bleeding, severe pain, foul-smelling drainage, redness, swelling).    2. It has been over 8 to 10 hours since surgery and you are still not able to urinate (pass water).    3.  Headache for over 24 hours.    To contact a doctor, call    [phone]    SCOPALAMINE Patch placed behind RIGHT ear for nausea relief.  Remove the patch in 24-26 hours.  Dispose in trash and wash hands carefully.     Information for Patients Discharging with a Transderm Scopolamine Patch       Dry mouth is a common side effect.    Drowsiness is another common side effect especially when combined with pain medication.  Please avoid activities that require mental alertness such as driving a car or making important legal decisions.    Since Scopolamine can cause temporary dilation of the pupils and blurred vision if it comes in contact with the eyes; be sure to wash your hands thoroughly with soap and water immediately after handling the patch.   When you remove your patch, please stick it to a tissue or paper towel for disposal.      Remove the patch immediately and contact a physician in the unlikely event that you experience symptoms of acute glaucoma (pain and reddening of the eyes, accompanied by dilated pupils).  Remove the patch if you develop any difficulties urinating.  If  you cannot urinate after removing your patch, please notify your surgeon.      **Acetaminophen (Tylenol)  975mg taken at 9:00am.    **Pain pill taken at 12:10pm.

## 2019-06-12 LAB — COPATH REPORT: NORMAL

## 2019-06-21 ENCOUNTER — OFFICE VISIT (OUTPATIENT)
Dept: SURGERY | Facility: CLINIC | Age: 39
End: 2019-06-21
Payer: COMMERCIAL

## 2019-06-21 DIAGNOSIS — Z98.890 S/P BILATERAL BREAST REDUCTION: Primary | ICD-10-CM

## 2019-06-21 PROCEDURE — 99024 POSTOP FOLLOW-UP VISIT: CPT | Performed by: PLASTIC SURGERY

## 2019-06-21 NOTE — PROGRESS NOTES
PRESENTING COMPLAINT:  Postoperative visit, status post bilateral breast reduction on 06/07/2019.      HISTORY OF PRESENTING COMPLAINT:  Ms. Bolanos is 38 years old.  She is about 2 weeks out from surgery, done well, no major issues, pathology benign.      PHYSICAL EXAMINATION:  Vital signs are stable.  She is afebrile, in no obvious distress.  Both breasts are symmetric, aesthetic, healing well.  No evidence of infection, seroma or hematoma.      ASSESSMENT AND PLAN:  Based on above findings, a diagnosis of bilateral breast reduction was made healing in well.  No concerns.  Advised aggressive moisturization and scar silicone strips placements. I have asked her to let nature take its course, not to do any strenuous activities for 2 more weeks.  I will see her back in 4 weeks.  All questions were answered.  She was happy with the visit.

## 2019-06-21 NOTE — NURSING NOTE
Chelsey Bolanos's goals for this visit include: post op breast reduction 6/7  She requests these members of her care team be copied on today's visit information: no    PCP: Penelope Cortes    Referring Provider:  No referring provider defined for this encounter.    There were no vitals taken for this visit.    Do you need any medication refills at today's visit? No..Maty Ng RN

## 2019-06-21 NOTE — LETTER
6/21/2019         RE: Chelsey Bolanos  [address]  [place] [address]        Dear Colleague,    Thank you for referring your patient, Chelsey Bolanos, to the [facility]. Please see a copy of my visit note below.    PRESENTING COMPLAINT:  Postoperative visit, status post bilateral breast reduction on 06/07/2019.      HISTORY OF PRESENTING COMPLAINT:  Ms. Bolanos is 38 years old.  She is about 2 weeks out from surgery, done well, no major issues, pathology benign.      PHYSICAL EXAMINATION:  Vital signs are stable.  She is afebrile, in no obvious distress.  Both breasts are symmetric, aesthetic, healing well.  No evidence of infection, seroma or hematoma.      ASSESSMENT AND PLAN:  Based on above findings, a diagnosis of bilateral breast reduction was made healing in well.  No concerns.  Advised aggressive moisturization and scar silicone strips placements. I have asked her to let nature take its course, not to do any strenuous activities for 2 more weeks.  I will see her back in 4 weeks.  All questions were answered.  She was happy with the visit.         Again, thank you for allowing me to participate in the care of your patient.        Sincerely,        ROXANNA French MD

## 2019-07-26 ENCOUNTER — OFFICE VISIT (OUTPATIENT)
Dept: SURGERY | Facility: CLINIC | Age: 39
End: 2019-07-26
Payer: COMMERCIAL

## 2019-07-26 DIAGNOSIS — Z98.890 S/P BILATERAL BREAST REDUCTION: Primary | ICD-10-CM

## 2019-07-26 PROCEDURE — 99024 POSTOP FOLLOW-UP VISIT: CPT | Performed by: PLASTIC SURGERY

## 2019-07-26 ASSESSMENT — PAIN SCALES - GENERAL: PAINLEVEL: NO PAIN (0)

## 2019-07-26 NOTE — NURSING NOTE
Chelsey Bolanos's goals for this visit include:   Chief Complaint   Patient presents with     Surgical Followup     7 week follow up       She requests these members of her care team be copied on today's visit information: Yes    PCP: Penelope Cortes    Referring Provider:  No referring provider defined for this encounter.    There were no vitals taken for this visit.    Do you need any medication refills at today's visit? No    Kathy Saini LPN

## 2019-07-26 NOTE — LETTER
7/26/2019         RE: Chelsey Bolanos  [address]  [place] [address]        Dear Colleague,    Thank you for referring your patient, Chesley Bolanos, to the [facility]. Please see a copy of my visit note below.    PRESENTING COMPLAINT:  Postoperative visit, status post bilateral breast reduction done 06/07/2019.      HISTORY OF PRESENTING COMPLAINT:  Ms. Bolanos is 38 years old.  She is 6 weeks out from surgery, completely healed, happy with the results, no issues.      PHYSICAL EXAMINATION:  Vital signs are stable.  She is afebrile, in no obvious distress.  Both breasts are completely healed.      ASSESSMENT AND PLAN:  Based on above findings, a diagnosis of bilateral breast reduction was made.  She is healed.  She is cleared to do whatever activity she wants.  I will see her back on a p.r.n. basis.         Again, thank you for allowing me to participate in the care of your patient.        Sincerely,        ROXANNA French MD

## 2019-07-26 NOTE — PROGRESS NOTES
PRESENTING COMPLAINT:  Postoperative visit, status post bilateral breast reduction done 06/07/2019.      HISTORY OF PRESENTING COMPLAINT:  Ms. Bolanos is 38 years old.  She is 6 weeks out from surgery, completely healed, happy with the results, no issues.      PHYSICAL EXAMINATION:  Vital signs are stable.  She is afebrile, in no obvious distress.  Both breasts are completely healed.      ASSESSMENT AND PLAN:  Based on above findings, a diagnosis of bilateral breast reduction was made.  She is healed.  She is cleared to do whatever activity she wants.  I will see her back on a p.r.n. basis.

## 2019-09-24 ENCOUNTER — MYC MEDICAL ADVICE (OUTPATIENT)
Dept: FAMILY MEDICINE | Facility: CLINIC | Age: 39
End: 2019-09-24

## 2019-10-14 DIAGNOSIS — E03.9 ACQUIRED HYPOTHYROIDISM: ICD-10-CM

## 2019-10-15 ENCOUNTER — TELEPHONE (OUTPATIENT)
Dept: FAMILY MEDICINE | Facility: CLINIC | Age: 39
End: 2019-10-15

## 2019-10-15 NOTE — TELEPHONE ENCOUNTER
Call and inform patient that request sent to provider to refill. Please inform that provider sent MyChart in September of the need for lab work.    Margie Ambrosio RN, [facility]

## 2019-10-15 NOTE — TELEPHONE ENCOUNTER
Routing refill request to provider for review/approval because:  Labs not current:  TSH patient informed in September of the needs for labs by provider.    Margie Ambrosio RN, [facility]

## 2019-10-15 NOTE — TELEPHONE ENCOUNTER
"Requested Prescriptions   Pending Prescriptions Disp Refills     EUTHYROX 100 MCG tablet [Pharmacy Med Name: EUTHYROX 100MCG TAB] 90 tablet 3     Sig: TAKE 1 TABLET BY MOUTH ONCE DAILY       Thyroid Protocol Failed - 10/14/2019  8:34 PM        Failed - Normal TSH on file in past 12 months     Recent Labs   Lab Test 09/20/18  1730   TSH 1.94              Passed - Patient is 12 years or older        Passed - Recent (12 mo) or future (30 days) visit within the authorizing provider's specialty     Patient has had an office visit with the authorizing provider or a provider within the authorizing providers department within the previous 12 mos or has a future within next 30 days. See \"Patient Info\" tab in inbasket, or \"Choose Columns\" in Meds & Orders section of the refill encounter.              Passed - Medication is active on med list        Passed - No active pregnancy on record     If patient is pregnant or has had a positive pregnancy test, please check TSH.          Passed - No positive pregnancy test in past 12 months     If patient is pregnant or has had a positive pregnancy test, please check TSH.            "

## 2019-10-15 NOTE — TELEPHONE ENCOUNTER
Patient informed. She will call back and schedule lab only appointment.  [id number], MEDICAL ASSISTANT

## 2019-10-15 NOTE — TELEPHONE ENCOUNTER
Reason for Call:  Other prescription    Detailed comments: patient requesting levothyroxine (SYNTHROID/LEVOTHROID) 100 MCG tablet, she said she called pharmacy and they told her to call clinic. Patient out of medication    Phone Number Patient can be reached at: Cell number on file:    Telephone Information:   Mobile [phone]       Best Time: any    Can we leave a detailed message on this number? YES    Call taken on 10/15/2019 at 3:41 PM by Lindsey Be

## 2019-10-16 RX ORDER — LEVOTHYROXINE SODIUM 100 UG/1
TABLET ORAL
Qty: 30 TABLET | Refills: 0 | Status: SHIPPED | OUTPATIENT
Start: 2019-10-16 | End: 2019-10-29

## 2019-10-22 ASSESSMENT — ENCOUNTER SYMPTOMS
SORE THROAT: 0
HEMATOCHEZIA: 0
BREAST MASS: 0
CONSTIPATION: 0
EYE PAIN: 0
DYSURIA: 0
PARESTHESIAS: 0
FREQUENCY: 0
MYALGIAS: 0
ARTHRALGIAS: 0
DIARRHEA: 0
HEARTBURN: 0
HEMATURIA: 0
SHORTNESS OF BREATH: 0
JOINT SWELLING: 0
ABDOMINAL PAIN: 0
PALPITATIONS: 0
CHILLS: 0
NERVOUS/ANXIOUS: 0
NAUSEA: 0
COUGH: 0
HEADACHES: 0
WEAKNESS: 0
DIZZINESS: 0
FEVER: 0

## 2019-10-24 DIAGNOSIS — Z00.00 ROUTINE GENERAL MEDICAL EXAMINATION AT A HEALTH CARE FACILITY: ICD-10-CM

## 2019-10-24 LAB
CHOLEST SERPL-MCNC: 213 MG/DL
HDLC SERPL-MCNC: 49 MG/DL
LDLC SERPL CALC-MCNC: 154 MG/DL
NONHDLC SERPL-MCNC: 164 MG/DL
TRIGL SERPL-MCNC: 50 MG/DL

## 2019-10-24 PROCEDURE — 36415 COLL VENOUS BLD VENIPUNCTURE: CPT | Performed by: FAMILY MEDICINE

## 2019-10-24 PROCEDURE — 84443 ASSAY THYROID STIM HORMONE: CPT | Performed by: FAMILY MEDICINE

## 2019-10-24 PROCEDURE — 80061 LIPID PANEL: CPT | Performed by: FAMILY MEDICINE

## 2019-10-24 PROCEDURE — 87389 HIV-1 AG W/HIV-1&-2 AB AG IA: CPT | Performed by: FAMILY MEDICINE

## 2019-10-28 ENCOUNTER — OFFICE VISIT (OUTPATIENT)
Dept: FAMILY MEDICINE | Facility: CLINIC | Age: 39
End: 2019-10-28
Payer: COMMERCIAL

## 2019-10-28 VITALS
TEMPERATURE: 98.6 F | WEIGHT: 126 LBS | HEART RATE: 79 BPM | SYSTOLIC BLOOD PRESSURE: 105 MMHG | OXYGEN SATURATION: 100 % | DIASTOLIC BLOOD PRESSURE: 70 MMHG | BODY MASS INDEX: 23.79 KG/M2 | RESPIRATION RATE: 18 BRPM | HEIGHT: 61 IN

## 2019-10-28 DIAGNOSIS — Z00.00 ROUTINE GENERAL MEDICAL EXAMINATION AT A HEALTH CARE FACILITY: Primary | ICD-10-CM

## 2019-10-28 DIAGNOSIS — B00.1 COLD SORE: ICD-10-CM

## 2019-10-28 DIAGNOSIS — E03.9 ACQUIRED HYPOTHYROIDISM: ICD-10-CM

## 2019-10-28 DIAGNOSIS — Z11.4 ENCOUNTER FOR SCREENING FOR HIV: ICD-10-CM

## 2019-10-28 LAB — TSH SERPL DL<=0.005 MIU/L-ACNC: 0.98 MU/L (ref 0.4–4)

## 2019-10-28 PROCEDURE — 99395 PREV VISIT EST AGE 18-39: CPT | Performed by: FAMILY MEDICINE

## 2019-10-28 RX ORDER — VALACYCLOVIR HYDROCHLORIDE 1 G/1
2000 TABLET, FILM COATED ORAL 2 TIMES DAILY
Qty: 4 TABLET | Refills: 0 | Status: SHIPPED | OUTPATIENT
Start: 2019-10-28 | End: 2020-11-02

## 2019-10-28 ASSESSMENT — ENCOUNTER SYMPTOMS
WEAKNESS: 0
EYE PAIN: 0
FREQUENCY: 0
COUGH: 0
NAUSEA: 0
HEARTBURN: 0
FEVER: 0
HEMATOCHEZIA: 0
JOINT SWELLING: 0
SHORTNESS OF BREATH: 0
ABDOMINAL PAIN: 0
DYSURIA: 0
DIARRHEA: 0
BREAST MASS: 0
PALPITATIONS: 0
NERVOUS/ANXIOUS: 0
CHILLS: 0
DIZZINESS: 0
MYALGIAS: 0
SORE THROAT: 0
HEADACHES: 0
ARTHRALGIAS: 0
PARESTHESIAS: 0
HEMATURIA: 0
CONSTIPATION: 0

## 2019-10-28 ASSESSMENT — MIFFLIN-ST. JEOR: SCORE: 1198.91

## 2019-10-28 NOTE — RESULT ENCOUNTER NOTE
Your cholesterol level is higher than previous.  It is in a range that is best managed by exercise and dietary restriction of cholesterol.  Please call or MyChart message me if you have any questions.    ELISAK

## 2019-10-28 NOTE — PROGRESS NOTES
"   SUBJECTIVE:   CC: Chelsey Bolanos is an 38 year old woman who presents for preventive health visit.     Healthy Habits:     Getting at least 3 servings of Calcium per day:  Yes    Bi-annual eye exam:  Yes    Dental care twice a year:  Yes    Sleep apnea or symptoms of sleep apnea:  None    Diet:  Regular (no restrictions)    Frequency of exercise:  2-3 days/week    Duration of exercise:  30-45 minutes    Taking medications regularly:  Yes    Medication side effects:  None    PHQ-2 Total Score: 0    Additional concerns today:  No      Patient complains of having chest pain while running (choice of exercise), she is unable to run for long periods of time without SOB. Constantly has to stop and walk in order to catch her breathe. She reports that this is chronic for her and unchanged since she was a young child.  No hindrance to regular activities.  Discussed additional evaluation options and patient declined -stating will watch for changes in symptoms and follow up if that occurs.       Hypothyroidism Follow-up      Since last visit, patient describes the following symptoms: Weight stable, no hair loss, no skin changes, no constipation, no loose stools    Cold sore  - bump on lip - tingling,  Will recur in this location.  Mild pain.  Occurs after \"internal fever\"     Today's PHQ-2 Score:   PHQ-2 ( 1999 Pfizer) 10/22/2019   Q1: Little interest or pleasure in doing things 0   Q2: Feeling down, depressed or hopeless 0   PHQ-2 Score 0   Q1: Little interest or pleasure in doing things Not at all   Q2: Feeling down, depressed or hopeless Not at all   PHQ-2 Score 0     Abuse: Current or Past(Physical, Sexual or Emotional)- No  Do you feel safe in your environment? Yes    Social History     Tobacco Use     Smoking status: Never Smoker     Smokeless tobacco: Never Used   Substance Use Topics     Alcohol use: No       Alcohol Use 10/22/2019   Prescreen: >3 drinks/day or >7 drinks/week? Not Applicable   Prescreen: >3 drinks/day " or >7 drinks/week? -       Reviewed orders with patient.  Reviewed health maintenance and updated orders accordingly - Yes  Lab work is in process  Labs reviewed in [facility]    Mammogram not appropriate for this patient based on age.    Pertinent mammograms are reviewed under the imaging tab.  History of abnormal Pap smear: NO - age 30-65 PAP every 5 years with negative HPV co-testing recommended  PAP / HPV Latest Ref Rng & Units 9/6/2017 8/18/2014   PAP - NIL NIL   HPV 16 DNA NEG:Negative Negative -   HPV 18 DNA NEG:Negative Negative -   OTHER HR HPV NEG:Negative Negative -     Reviewed and updated as needed this visit by clinical staff  Tobacco  Allergies  Meds  Med Hx  Surg Hx  Fam Hx  Soc Hx        Reviewed and updated as needed this visit by Provider  Tobacco  Allergies  Meds  Med Hx  Surg Hx  Fam Hx  Soc Hx       Past Medical History:   Diagnosis Date     History of anemia 10/15/2013     Thyroid disease       Past Surgical History:   Procedure Laterality Date     GYN SURGERY      C section x 2     MAMMOPLASTY REDUCTION BILATERAL Bilateral 6/7/2019    Procedure: MAMMOPLASTY, REDUCTION, BILATERAL;  Surgeon: ROXANNA French MD;  Location:  OR       Review of Systems   Constitutional: Negative for chills and fever.   HENT: Negative for congestion, ear pain, hearing loss and sore throat.    Eyes: Negative for pain and visual disturbance.   Respiratory: Negative for cough and shortness of breath.    Cardiovascular: Negative for chest pain, palpitations and peripheral edema.   Gastrointestinal: Negative for abdominal pain, constipation, diarrhea, heartburn, hematochezia and nausea.   Breasts:  Negative for tenderness, breast mass and discharge.   Genitourinary: Negative for dysuria, frequency, genital sores, hematuria, pelvic pain, urgency, vaginal bleeding and vaginal discharge.   Musculoskeletal: Negative for arthralgias, joint swelling and myalgias.   Skin: Negative for rash.   Neurological:  "Negative for dizziness, weakness, headaches and paresthesias.   Psychiatric/Behavioral: Negative for mood changes. The patient is not nervous/anxious.      10 point ROS of systems including   Constitutional, Eyes, Respiratory, Cardiovascular, Gastroenterology, Genitourinary, Integumentary, Muscularskeletal, Psychiatric were all negative except for pertinent positives noted in my HPI.  Reports absence of back pain since the breast reduction surgery completed.       This document serves as a record of the services and decisions personally performed and made by Penelope Cortes MD. It was created on her behalf by Vamsi Dave, trained medical scribe. The creation of this document is based on the provider's statements to the medical scribe.    OBJECTIVE:   /70 (BP Location: Left arm, Patient Position: Sitting, Cuff Size: Adult Regular)   Pulse 79   Temp 98.6  F (37  C) (Oral)   Resp 18   Ht 1.549 m (5' 1\")   Wt 57.2 kg (126 lb)   SpO2 100%   BMI 23.81 kg/m    Physical Exam  GENERAL: healthy, alert and no distress  HENT: ear canals and TM's normal, nose and mouth without ulcers or lesions  NECK: no adenopathy, no asymmetry, masses, or scars and thyroid normal to palpation  RESP: lungs clear to auscultation - no rales, rhonchi or wheezes  BREAST: normal without masses, tenderness or nipple discharge and no palpable axillary masses or adenopathy.  Healing scars from breast reduction surgery.   CV: regular rate and rhythm, normal S1 S2, no S3 or S4, no murmur, click or rub, no peripheral edema and peripheral pulses strong  ABDOMEN: soft, nontender, no hepatosplenomegaly, no masses and bowel sounds normal  MS: no gross musculoskeletal defects noted, no edema  SKIN: no suspicious lesions or rashes.  Left angle of mouth with cluster papules, mild scaling, mild erythema.  No open area noted.  PSYCH: mentation appears normal, affect normal/bright    Diagnostic Test Results:  Labs reviewed in Epic  No results " found for this or any previous visit (from the past 24 hour(s)).  Results for orders placed or performed in visit on 10/24/19   Lipid panel reflex to direct LDL Fasting   Result Value Ref Range    Cholesterol 213 (H) <200 mg/dL    Triglycerides 50 <150 mg/dL    HDL Cholesterol 49 (L) >49 mg/dL    LDL Cholesterol Calculated 154 (H) <100 mg/dL    Non HDL Cholesterol 164 (H) <130 mg/dL       ASSESSMENT/PLAN:   1. Routine general medical examination at a health care facility  Preventive care and screenings discussed and updated.  Review of cholesterol testing.    Your cholesterol level is higher than previous.  It is in a range that is best managed by exercise and dietary restriction of cholesterol.   Please call or [employer] message me if you have any questions.       PSK   Patient questioning if she can use Glutathione supplement.  No sign of interaction with her medication or other significant side effects on review of this supplement.  No definite documented dose noted - suggested 250 mg daily.  Short term trial if patient desires recommended.        2. Acquired hypothyroidism  Euthyroid on testing today.  TSH   Date Value Ref Range Status   10/24/2019 0.98 0.40 - 4.00 mU/L Final   - TSH WITH FREE T4 REFLEX  - levothyroxine (EUTHYROX) 100 MCG tablet; Take 1 tablet (100 mcg) by mouth daily  Dispense: 90 tablet; Refill: 3    3. Cold sore  Trial medication to treat current outbreak.  If effective, refill can be given if needed.   - valACYclovir (VALTREX) 1000 mg tablet; Take 2 tablets (2,000 mg) by mouth 2 times daily for 1 day  Dispense: 4 tablet; Refill: 0    4. Encounter for screening for HIV  - HIV Screening    COUNSELING:  Reviewed preventive health counseling, as reflected in patient instructions       Regular exercise       Healthy diet/nutrition       Immunizations    Return to clinic for vaccination for influenza when available.          HIV screeninx in teen years, 1x in adult years, and at intervals if  "high risk    Estimated body mass index is 23.81 kg/m  as calculated from the following:    Height as of this encounter: 1.549 m (5' 1\").    Weight as of this encounter: 57.2 kg (126 lb).     reports that she has never smoked. She has never used smokeless tobacco.      Counseling Resources:  ATP IV Guidelines  Pooled Cohorts Equation Calculator  Breast Cancer Risk Calculator  FRAX Risk Assessment  ICSI Preventive Guidelines  Dietary Guidelines for Americans, 2010  USDA's MyPlate  ASA Prophylaxis  Lung CA Screening      The information in this document, created by the medical scribe, Vamsi Dave, for me, accurately reflects the services I personally performed and the decisions made by me. I have reviewed and approved this document for accuracy prior to leaving the patient care area. 5:26 PM 10/28/2019    Penelope Cortes MD  [facility]        Patient Instructions     Additional labs will be added to blood testing from last week.      You can use the Glutathione as a trial for your symptoms.        "

## 2019-10-28 NOTE — PATIENT INSTRUCTIONS
Additional labs will be added to blood testing from last week.      You can use the Glutathione as a trial for your symptoms.          At [facility], we strive to deliver an exceptional experience to you, every time we see you. If you receive a survey, please complete it as we do value your feedback.  If you have MyChart, you can expect to receive results automatically within 24 hours of their completion.  Your provider will send a note interpreting your results as well.   If you do not have MyChart, you should receive your results in about a week by mail.    Your care team:     Family Medicine   SCOTTIE Galvan MD Emily Bunt, BELÉN [facility]   S. MD Penelope Torres MD Angela Wermerskirchen, MD    Internal Medicine  Cedric Gomes MD coming 2020     Clinic hours: Monday - Wednesday 7 am-7 pm   Thursdays and Fridays 7 am-5 pm.     [facility]: Monday - Friday 11 am-9 pm,   Saturday and Sunday 9 am-5 pm.    [facility]: Monday -Thursday 8 am-8 pm; Friday 8 am-6 pm; Saturday and Sunday 9 am-5 pm.     [facility]: Monday - Thursday 8 am - 7 pm; Friday 8 am - 6 pm    Clinic: [phone]   [facility]: [phone] m [facility]: [phone]

## 2019-10-29 LAB — HIV 1+2 AB+HIV1 P24 AG SERPL QL IA: NONREACTIVE

## 2019-10-29 RX ORDER — LEVOTHYROXINE SODIUM 100 UG/1
100 TABLET ORAL DAILY
Qty: 90 TABLET | Refills: 3 | Status: SHIPPED | OUTPATIENT
Start: 2019-10-29 | End: 2020-10-20

## 2019-10-29 NOTE — RESULT ENCOUNTER NOTE
HIV screening is negative/normal.  Please call or [employer] message me if you have any questions.    PSK

## 2019-10-29 NOTE — RESULT ENCOUNTER NOTE
Your thyroid testing is normal, indicating you are on the correct dosage of thyroid medication.  Refills of this will be sent now.  Please call or MyChart message me if you have any questions.    JEREMY

## 2020-03-02 ENCOUNTER — HEALTH MAINTENANCE LETTER (OUTPATIENT)
Age: 40
End: 2020-03-02

## 2020-10-17 DIAGNOSIS — E03.9 ACQUIRED HYPOTHYROIDISM: ICD-10-CM

## 2020-10-20 RX ORDER — LEVOTHYROXINE SODIUM 100 UG/1
TABLET ORAL
Qty: 90 TABLET | Refills: 0 | Status: SHIPPED | OUTPATIENT
Start: 2020-10-20 | End: 2021-01-19

## 2020-10-20 NOTE — TELEPHONE ENCOUNTER
"Prescription approved per [facility] Refill Protocol.    Next 5 appointments (look out 90 days)    Nov 02, 2020  5:00 PM  PHYSICAL with Penelope Cortes MD  [facility] ([facility]) [address] [place] [id number] [phone]          Requested Prescriptions   Pending Prescriptions Disp Refills     EUTHYROX 100 MCG tablet [Pharmacy Med Name: Euthyrox 100 MCG Oral Tablet] 90 tablet 0     Sig: Take 1 tablet by mouth once daily       Thyroid Protocol Passed - 10/20/2020  1:47 PM        Passed - Patient is 12 years or older        Passed - Recent (12 mo) or future (30 days) visit within the authorizing provider's specialty     Patient has had an office visit with the authorizing provider or a provider within the authorizing providers department within the previous 12 mos or has a future within next 30 days. See \"Patient Info\" tab in inbasket, or \"Choose Columns\" in Meds & Orders section of the refill encounter.              Passed - Medication is active on med list        Passed - Normal TSH on file in past 12 months     Recent Labs   Lab Test 10/24/19  0850   TSH 0.98              Passed - No active pregnancy on record     If patient is pregnant or has had a positive pregnancy test, please check TSH.          Passed - No positive pregnancy test in past 12 months     If patient is pregnant or has had a positive pregnancy test, please check TSH.             Jesse Soriano RN, BSN, PHN    "

## 2020-11-01 ASSESSMENT — ENCOUNTER SYMPTOMS
HEADACHES: 0
COUGH: 0
NAUSEA: 0
CHILLS: 0
NERVOUS/ANXIOUS: 0
SORE THROAT: 0
DIZZINESS: 0
HEMATOCHEZIA: 0
JOINT SWELLING: 0
EYE PAIN: 0
PALPITATIONS: 0
PARESTHESIAS: 0
ABDOMINAL PAIN: 0
ARTHRALGIAS: 0
DIARRHEA: 0
SHORTNESS OF BREATH: 0
CONSTIPATION: 0
BREAST MASS: 0
DYSURIA: 0
MYALGIAS: 0
FREQUENCY: 0
FEVER: 0
HEARTBURN: 0
HEMATURIA: 0
WEAKNESS: 0

## 2020-11-02 ENCOUNTER — OFFICE VISIT (OUTPATIENT)
Dept: FAMILY MEDICINE | Facility: CLINIC | Age: 40
End: 2020-11-02
Payer: COMMERCIAL

## 2020-11-02 VITALS
HEIGHT: 61 IN | RESPIRATION RATE: 16 BRPM | SYSTOLIC BLOOD PRESSURE: 114 MMHG | HEART RATE: 82 BPM | DIASTOLIC BLOOD PRESSURE: 77 MMHG | OXYGEN SATURATION: 100 % | WEIGHT: 129 LBS | BODY MASS INDEX: 24.35 KG/M2 | TEMPERATURE: 98.4 F

## 2020-11-02 DIAGNOSIS — D50.9 IRON DEFICIENCY ANEMIA, UNSPECIFIED IRON DEFICIENCY ANEMIA TYPE: ICD-10-CM

## 2020-11-02 DIAGNOSIS — Z00.00 ROUTINE GENERAL MEDICAL EXAMINATION AT A HEALTH CARE FACILITY: Primary | ICD-10-CM

## 2020-11-02 DIAGNOSIS — Z23 FLU VACCINE NEED: ICD-10-CM

## 2020-11-02 DIAGNOSIS — E03.9 ACQUIRED HYPOTHYROIDISM: ICD-10-CM

## 2020-11-02 LAB
ERYTHROCYTE [DISTWIDTH] IN BLOOD BY AUTOMATED COUNT: 11.6 % (ref 10–15)
HCT VFR BLD AUTO: 35.9 % (ref 35–47)
HGB BLD-MCNC: 11.9 G/DL (ref 11.7–15.7)
MCH RBC QN AUTO: 30.1 PG (ref 26.5–33)
MCHC RBC AUTO-ENTMCNC: 33.1 G/DL (ref 31.5–36.5)
MCV RBC AUTO: 91 FL (ref 78–100)
PLATELET # BLD AUTO: 270 10E9/L (ref 150–450)
RBC # BLD AUTO: 3.96 10E12/L (ref 3.8–5.2)
WBC # BLD AUTO: 7.6 10E9/L (ref 4–11)

## 2020-11-02 PROCEDURE — 36415 COLL VENOUS BLD VENIPUNCTURE: CPT | Performed by: FAMILY MEDICINE

## 2020-11-02 PROCEDURE — 84439 ASSAY OF FREE THYROXINE: CPT | Performed by: FAMILY MEDICINE

## 2020-11-02 PROCEDURE — 84480 ASSAY TRIIODOTHYRONINE (T3): CPT | Performed by: FAMILY MEDICINE

## 2020-11-02 PROCEDURE — 90686 IIV4 VACC NO PRSV 0.5 ML IM: CPT | Performed by: FAMILY MEDICINE

## 2020-11-02 PROCEDURE — 84443 ASSAY THYROID STIM HORMONE: CPT | Performed by: FAMILY MEDICINE

## 2020-11-02 PROCEDURE — 99395 PREV VISIT EST AGE 18-39: CPT | Mod: 25 | Performed by: FAMILY MEDICINE

## 2020-11-02 PROCEDURE — 80053 COMPREHEN METABOLIC PANEL: CPT | Performed by: FAMILY MEDICINE

## 2020-11-02 PROCEDURE — 85027 COMPLETE CBC AUTOMATED: CPT | Performed by: FAMILY MEDICINE

## 2020-11-02 PROCEDURE — 90471 IMMUNIZATION ADMIN: CPT | Performed by: FAMILY MEDICINE

## 2020-11-02 ASSESSMENT — ENCOUNTER SYMPTOMS
DIZZINESS: 0
FEVER: 0
CHILLS: 0
MYALGIAS: 0
DYSURIA: 0
SHORTNESS OF BREATH: 0
HEADACHES: 0
EYE PAIN: 0
HEMATURIA: 0
HEMATOCHEZIA: 0
ARTHRALGIAS: 0
SORE THROAT: 0
NAUSEA: 0
JOINT SWELLING: 0
WEAKNESS: 0
CONSTIPATION: 0
ABDOMINAL PAIN: 0
HEARTBURN: 0
BREAST MASS: 0
FREQUENCY: 0
DIARRHEA: 0
PARESTHESIAS: 0
NERVOUS/ANXIOUS: 0
PALPITATIONS: 0
COUGH: 0

## 2020-11-02 ASSESSMENT — MIFFLIN-ST. JEOR: SCORE: 1207.52

## 2020-11-02 ASSESSMENT — PAIN SCALES - GENERAL: PAINLEVEL: NO PAIN (0)

## 2020-11-02 NOTE — PATIENT INSTRUCTIONS
At [facility], we strive to deliver an exceptional experience to you, every time we see you. If you receive a survey, please complete it as we do value your feedback.  If you have MyChart, you can expect to receive results automatically within 24 hours of their completion.  Your provider will send a note interpreting your results as well.   If you do not have MyChart, you should receive your results in about a week by mail.    Your care team:                            Family Medicine Internal Medicine   MD Mickey Rene, MD Cedric Weston MD Katya Georgiev PA-C Megan Hill, APRN CNP    Abhi Castellanos, MD Pediatrics   Emanuel Siddiqui, PADianaC  Aster Wilcox, CNP MD Beth Vaughan APRN CNP   MD Margarita Jolly MD Deborah Mielke, MD Maame Galarza, APRN CNP  Ginny Perez, PASHABBIR [place], CNP  MD Penelope Torres MD Angela Wermerskirchen, MD      Clinic hours: Monday - Thursday 7 am-7 pm; Fridays 7 am-5 pm.   Urgent care: Monday - Friday 11 am-9 pm; Saturday and Sunday 9 am-5 pm.    Clinic: [phone]       [facility]: Monday - Thursday 8 am - 7 pm; Friday 8 am - 6 pm  [facility]: [phone]     Use www.oncare.org for 24/7 diagnosis and treatment of dozens of conditions.    Preventive Health Recommendations  Female Ages 26 - 39  Yearly exam:   See your health care provider every year in order to    Review health changes.     Discuss preventive care.      Review your medicines if you your doctor has prescribed any.    Until age 30: Get a Pap test every three years (more often if you have had an abnormal result).    After age 30: Talk to your doctor about whether you should have a Pap test every 3 years or have a Pap test with HPV screening every 5 years.   You do not need a Pap test if your uterus was removed (hysterectomy)  and you have not had cancer.  You should be tested each year for STDs (sexually transmitted diseases), if you're at risk.   Talk to your provider about how often to have your cholesterol checked.  If you are at risk for diabetes, you should have a diabetes test (fasting glucose).  Shots: Get a flu shot each year. Get a tetanus shot every 10 years.   Nutrition:     Eat at least 5 servings of fruits and vegetables each day.    Eat whole-grain bread, whole-wheat pasta and brown rice instead of white grains and rice.    Get adequate Calcium and Vitamin D.     Lifestyle    Exercise at least 150 minutes a week (30 minutes a day, 5 days of the week). This will help you control your weight and prevent disease.    Limit alcohol to one drink per day.    No smoking.     Wear sunscreen to prevent skin cancer.    See your dentist every six months for an exam and cleaning.

## 2020-11-02 NOTE — PROGRESS NOTES
SUBJECTIVE:   CC: Chelsey CORTÉS Luis Miguel is an 39 year old woman who presents for preventive health visit.     Patient has been advised of split billing requirements and indicates understanding: Yes  Healthy Habits:     Getting at least 3 servings of Calcium per day:  Yes    Bi-annual eye exam:  Yes    Dental care twice a year:  Yes    Sleep apnea or symptoms of sleep apnea:  None    Diet:  Regular (no restrictions)    Frequency of exercise:  2-3 days/week    Duration of exercise:  15-30 minutes    Taking medications regularly:  Yes    Medication side effects:  None    PHQ-2 Total Score: 0    Additional concerns today:  No    Exercise at home.    Hypothyroidism Follow-up      Since last visit, patient describes the following symptoms: Weight stable, no hair loss, no skin changes, no constipation, no loose stools  Hair loss and weight gain.      Iron deficiency anemia:  Taking iron supplement regularly.      Today's PHQ-2 Score:   PHQ-2 ( 1999 Pfizer) 11/1/2020   Q1: Little interest or pleasure in doing things 0   Q2: Feeling down, depressed or hopeless 0   PHQ-2 Score 0   Q1: Little interest or pleasure in doing things Not at all   Q2: Feeling down, depressed or hopeless Not at all   PHQ-2 Score 0       Abuse: Current or Past (Physical, Sexual or Emotional) - No  Do you feel safe in your environment? Yes        Social History     Tobacco Use     Smoking status: Never Smoker     Smokeless tobacco: Never Used   Substance Use Topics     Alcohol use: No     If you drink alcohol do you typically have >3 drinks per day or >7 drinks per week? No    Alcohol Use 11/1/2020   Prescreen: >3 drinks/day or >7 drinks/week? Not Applicable   Prescreen: >3 drinks/day or >7 drinks/week? -   No flowsheet data found.    Reviewed orders with patient.  Reviewed health maintenance and updated orders accordingly - Yes  BP Readings from Last 3 Encounters:   11/02/20 114/77   10/28/19 105/70   06/07/19 98/70    Wt Readings from Last 3  Encounters:   20 58.5 kg (129 lb)   10/28/19 57.2 kg (126 lb)   19 58.5 kg (128 lb 14.4 oz)                    Mammogram not appropriate for this patient based on age.    Pertinent mammograms are reviewed under the imaging tab.  History of abnormal Pap smear: NO - age 30-65 PAP every 5 years with negative HPV co-testing recommended  PAP / HPV Latest Ref Rng & Units 2017   PAP - NIL NIL   HPV 16 DNA NEG:Negative Negative -   HPV 18 DNA NEG:Negative Negative -   OTHER HR HPV NEG:Negative Negative -     Reviewed and updated as needed this visit by clinical staff  Tobacco  Allergies  Meds   Med Hx  Surg Hx  Fam Hx  Soc Hx        Reviewed and updated as needed this visit by Provider  Tobacco  Allergies  Meds   Med Hx  Surg Hx  Fam Hx  Soc Hx       Past Medical History:   Diagnosis Date     History of anemia 10/15/2013     Thyroid disease       Past Surgical History:   Procedure Laterality Date     GYN SURGERY      C section x 2     MAMMOPLASTY REDUCTION BILATERAL Bilateral 2019    Procedure: MAMMOPLASTY, REDUCTION, BILATERAL;  Surgeon: ROXANNA French MD;  Location:  OR     OB History    Para Term  AB Living   2 2 2 0 0 2   SAB TAB Ectopic Multiple Live Births   0 0 0 0 2      # Outcome Date GA Lbr Alexandro/2nd Weight Sex Delivery Anes PTL Lv   2 Term     M CS-LTranv   HALEY   1 Term     M CS-LTranv   HALEY      Complications: Fetal Intolerance       Review of Systems   Constitutional: Negative for chills and fever.   HENT: Negative for congestion, ear pain, hearing loss and sore throat.    Eyes: Negative for pain and visual disturbance.   Respiratory: Negative for cough and shortness of breath.    Cardiovascular: Negative for chest pain, palpitations and peripheral edema.   Gastrointestinal: Negative for abdominal pain, constipation, diarrhea, heartburn, hematochezia and nausea.   Breasts:  Negative for tenderness, breast mass and discharge.   Genitourinary:  "Positive for vaginal bleeding and vaginal discharge. Negative for dysuria, frequency, genital sores, hematuria, pelvic pain and urgency.   Musculoskeletal: Negative for arthralgias, joint swelling and myalgias.   Skin: Negative for rash.   Neurological: Negative for dizziness, weakness, headaches and paresthesias.   Psychiatric/Behavioral: Negative for mood changes. The patient is not nervous/anxious.    normal menstruation monthly.         OBJECTIVE:   /77 (BP Location: Right arm, Patient Position: Sitting, Cuff Size: Adult Regular)   Pulse 82   Temp 98.4  F (36.9  C) (Tympanic)   Resp 16   Ht 1.549 m (5' 1\")   Wt 58.5 kg (129 lb)   LMP 10/30/2020   SpO2 100%   BMI 24.37 kg/m    Physical Exam  GENERAL: healthy, alert and no distress  EYES: Eyes grossly normal to inspection, PERRL and conjunctivae and sclerae normal  HENT: ear canals and TM's normal, nose and mouth without ulcers or lesions  NECK: no adenopathy, no asymmetry, masses, or scars and thyroid normal to palpation  RESP: lungs clear to auscultation - no rales, rhonchi or wheezes  BREAST: normal without masses, tenderness or nipple discharge and no palpable axillary masses or adenopathy  CV: regular rate and rhythm, normal S1 S2, no S3 or S4, no murmur, click or rub, no peripheral edema and peripheral pulses strong  ABDOMEN: soft, nontender, no hepatosplenomegaly, no masses and bowel sounds normal   (female): deferred - on menses currently  MS: no gross musculoskeletal defects noted, no edema  SKIN: no suspicious lesions or rashes  NEURO: Normal strength and tone, mentation intact and speech normal  PSYCH: mentation appears normal, affect normal/bright  LYMPH: no cervical, supraclavicular, axillary, or inguinal adenopathy    Diagnostic Test Results:  Labs reviewed in Epic  Results for orders placed or performed in visit on 11/02/20   CBC with platelets     Status: None   Result Value Ref Range    WBC 7.6 4.0 - 11.0 10e9/L    RBC Count 3.96 " "3.8 - 5.2 10e12/L    Hemoglobin 11.9 11.7 - 15.7 g/dL    Hematocrit 35.9 35.0 - 47.0 %    MCV 91 78 - 100 fl    MCH 30.1 26.5 - 33.0 pg    MCHC 33.1 31.5 - 36.5 g/dL    RDW 11.6 10.0 - 15.0 %    Platelet Count 270 150 - 450 10e9/L       ASSESSMENT/PLAN:   1. Routine general medical examination at a health care facility  Screening and preventative care discussed.   - Comprehensive metabolic panel  - CBC with platelets    2. Acquired hypothyroidism  Refill appropriate dose of thyroid medication when results return.  - TSH  - T4 free  - T3 total    3. Iron deficiency anemia, unspecified iron deficiency anemia type  Normal blood cell counts, continue current supplement.  - CBC with platelets    4. Flu vaccine need  Flu vaccine updated.  - INFLUENZA VACCINE IM > 6 MONTHS VALENT IIV4 [86102]    Patient has been advised of split billing requirements and indicates understanding: Yes  COUNSELING:  Reviewed preventive health counseling, as reflected in patient instructions       Regular exercise       Healthy diet/nutrition       Vision screening       Immunizations    Vaccinated for: Influenza             Family planning       Osteoporosis Prevention/Bone Health    Estimated body mass index is 24.37 kg/m  as calculated from the following:    Height as of this encounter: 1.549 m (5' 1\").    Weight as of this encounter: 58.5 kg (129 lb).        She reports that she has never smoked. She has never used smokeless tobacco.      Counseling Resources:  ATP IV Guidelines  Pooled Cohorts Equation Calculator  Breast Cancer Risk Calculator  BRCA-Related Cancer Risk Assessment: FHS-7 Tool  FRAX Risk Assessment  ICSI Preventive Guidelines  Dietary Guidelines for Americans, 2010  USDA's MyPlate  ASA Prophylaxis  Lung CA Screening    Penelope Cortes MD  [facility]    "

## 2020-11-03 LAB
ALBUMIN SERPL-MCNC: 3.7 G/DL (ref 3.4–5)
ALP SERPL-CCNC: 92 U/L (ref 40–150)
ALT SERPL W P-5'-P-CCNC: 21 U/L (ref 0–50)
ANION GAP SERPL CALCULATED.3IONS-SCNC: 7 MMOL/L (ref 3–14)
AST SERPL W P-5'-P-CCNC: 12 U/L (ref 0–45)
BILIRUB SERPL-MCNC: 0.3 MG/DL (ref 0.2–1.3)
BUN SERPL-MCNC: 12 MG/DL (ref 7–30)
CALCIUM SERPL-MCNC: 8.6 MG/DL (ref 8.5–10.1)
CHLORIDE SERPL-SCNC: 109 MMOL/L (ref 94–109)
CO2 SERPL-SCNC: 26 MMOL/L (ref 20–32)
CREAT SERPL-MCNC: 0.53 MG/DL (ref 0.52–1.04)
GFR SERPL CREATININE-BSD FRML MDRD: >90 ML/MIN/{1.73_M2}
GLUCOSE SERPL-MCNC: 95 MG/DL (ref 70–99)
POTASSIUM SERPL-SCNC: 4 MMOL/L (ref 3.4–5.3)
PROT SERPL-MCNC: 7 G/DL (ref 6.8–8.8)
SODIUM SERPL-SCNC: 142 MMOL/L (ref 133–144)
T3 SERPL-MCNC: 93 NG/DL (ref 60–181)
T4 FREE SERPL-MCNC: 1.35 NG/DL (ref 0.76–1.46)
TSH SERPL DL<=0.005 MIU/L-ACNC: 0.53 MU/L (ref 0.4–4)

## 2020-11-04 NOTE — RESULT ENCOUNTER NOTE
These results are all normal.  Your thyroid testing is normal - the active thyroid hormone (free T4)is in the mid to upper normal range.  The precursor thyroid levels T3 are normal and the Thyroid Stimulating Hormone (TSH) is normal as well.  Your blood sugar, kidney and liver testing are normal.   Your blood cell counts are normal.  Please call or MyChart message me if you have any questions.    ELISAK

## 2021-01-18 DIAGNOSIS — E03.9 ACQUIRED HYPOTHYROIDISM: ICD-10-CM

## 2021-01-19 RX ORDER — LEVOTHYROXINE SODIUM 100 UG/1
TABLET ORAL
Qty: 90 TABLET | Refills: 2 | Status: SHIPPED | OUTPATIENT
Start: 2021-01-19 | End: 2021-09-24

## 2021-01-19 NOTE — TELEPHONE ENCOUNTER
Prescription approved per [facility] Refill Protocol.  Marybel Pacheco RN

## 2021-09-24 DIAGNOSIS — E03.9 ACQUIRED HYPOTHYROIDISM: ICD-10-CM

## 2021-09-24 RX ORDER — LEVOTHYROXINE SODIUM 100 UG/1
TABLET ORAL
Qty: 90 TABLET | Refills: 0 | Status: SHIPPED | OUTPATIENT
Start: 2021-09-24 | End: 2022-01-09

## 2021-10-03 ENCOUNTER — HEALTH MAINTENANCE LETTER (OUTPATIENT)
Age: 41
End: 2021-10-03

## 2022-01-05 DIAGNOSIS — E03.9 ACQUIRED HYPOTHYROIDISM: ICD-10-CM

## 2022-01-07 NOTE — TELEPHONE ENCOUNTER
"Requested Prescriptions   Pending Prescriptions Disp Refills    EUTHYROX 100 MCG tablet [Pharmacy Med Name: Euthyrox 100 MCG Oral Tablet] 90 tablet 0     Sig: Take 1 tablet by mouth once daily        Thyroid Protocol Failed - 1/5/2022 12:55 PM        Failed - Recent (12 mo) or future (30 days) visit within the authorizing provider's specialty     Patient has had an office visit with the authorizing provider or a provider within the authorizing providers department within the previous 12 mos or has a future within next 30 days. See \"Patient Info\" tab in inbasket, or \"Choose Columns\" in Meds & Orders section of the refill encounter.              Failed - Normal TSH on file in past 12 months     Recent Labs   Lab Test 11/02/20  1805   TSH 0.53                Passed - Patient is 12 years or older        Passed - Medication is active on med list        Passed - No active pregnancy on record     If patient is pregnant or has had a positive pregnancy test, please check TSH.          Passed - No positive pregnancy test in past 12 months     If patient is pregnant or has had a positive pregnancy test, please check TSH.                "

## 2022-01-09 RX ORDER — LEVOTHYROXINE SODIUM 100 UG/1
100 TABLET ORAL DAILY
Qty: 30 TABLET | Refills: 0 | Status: SHIPPED | OUTPATIENT
Start: 2022-01-09 | End: 2022-02-15

## 2022-01-23 ENCOUNTER — HEALTH MAINTENANCE LETTER (OUTPATIENT)
Age: 42
End: 2022-01-23

## 2022-02-08 DIAGNOSIS — E03.9 ACQUIRED HYPOTHYROIDISM: ICD-10-CM

## 2022-02-08 NOTE — TELEPHONE ENCOUNTER
"Routing refill request to provider for review/approval because:  Elizabeth given x1 and patient did not follow up, please advise  Labs not current:  TSH  Patient needs to be seen because it has been more than 1 year since last office visit.    Requested Prescriptions   Pending Prescriptions Disp Refills    EUTHYROX 100 MCG tablet [Pharmacy Med Name: Euthyrox 100 MCG Oral Tablet] 30 tablet 0     Sig: TAKE 1 TABLET BY MOUTH ONCE DAILY . APPOINTMENT REQUIRED FOR FUTURE REFILLS        Thyroid Protocol Failed - 2/8/2022  7:14 AM        Failed - Recent (12 mo) or future (30 days) visit within the authorizing provider's specialty     Patient has had an office visit with the authorizing provider or a provider within the authorizing providers department within the previous 12 mos or has a future within next 30 days. See \"Patient Info\" tab in inbasket, or \"Choose Columns\" in Meds & Orders section of the refill encounter.              Failed - Normal TSH on file in past 12 months     Recent Labs   Lab Test 11/02/20  1805   TSH 0.53                Passed - Patient is 12 years or older        Passed - Medication is active on med list        Passed - No active pregnancy on record     If patient is pregnant or has had a positive pregnancy test, please check TSH.          Passed - No positive pregnancy test in past 12 months     If patient is pregnant or has had a positive pregnancy test, please check TSH.              Charisse Bear RN, BSN  [facility]    "

## 2022-02-09 NOTE — TELEPHONE ENCOUNTER
I'm not sure why this keeps getting routed to the refill pool.  PCP has asked for an appointment to be made within the month and there is no pending appointment.   Routing back to TC pool to assist patient with appointment. When appointment is made, please route to PCP to fill medication.     Nicole Amaya BSN, RN

## 2022-02-09 NOTE — TELEPHONE ENCOUNTER
Patient needs to be seen by Penelope Cortes MD  (provider or resource)  Is there a time frame in which the patient should be seen Yes: within month  What does the patient need to be seen regarding thyroid/physical/labs.   Does patient need to come fasting No  Phone: [phone] (home)  When workflow completed Route to provider if refill needed to get to follow up appointment.    Clinic: [facility] at [phone]    'Hi, this is (your name) I am calling from (provider's name) office. After reviewing your chart, (Provider's name) has indicated that you need an appointment to review (reason for visit). May I assist you in making this appointment? (Please contact us via [employer] or by phone at (Clinic name and Phone number) to schedule this appointment at your earliest convenience)'    Was first outreach attempted?No  If yes, the Second attempt due on:

## 2022-02-15 RX ORDER — LEVOTHYROXINE SODIUM 100 UG/1
TABLET ORAL
Qty: 30 TABLET | Refills: 0 | Status: SHIPPED | OUTPATIENT
Start: 2022-02-15 | End: 2022-02-24

## 2022-02-23 ASSESSMENT — ENCOUNTER SYMPTOMS
NAUSEA: 0
MYALGIAS: 0
CONSTIPATION: 0
DIZZINESS: 0
HEARTBURN: 0
JOINT SWELLING: 0
ABDOMINAL PAIN: 0
SORE THROAT: 0
FEVER: 0
ARTHRALGIAS: 0
PARESTHESIAS: 0
COUGH: 0
BREAST MASS: 0
CHILLS: 0
NERVOUS/ANXIOUS: 0
PALPITATIONS: 0
DYSURIA: 0
SHORTNESS OF BREATH: 0
WEAKNESS: 0
DIARRHEA: 0
HEMATURIA: 0
EYE PAIN: 0
HEMATOCHEZIA: 0
HEADACHES: 0
FREQUENCY: 0

## 2022-02-23 NOTE — PROGRESS NOTES
SUBJECTIVE:   CC: Chelsey Bolanos is an 41 year old woman who presents for preventive health visit.       Patient has been advised of split billing requirements and indicates understanding: Yes  Healthy Habits:     Getting at least 3 servings of Calcium per day:  Yes    Bi-annual eye exam:  Yes    Dental care twice a year:  Yes    Sleep apnea or symptoms of sleep apnea:  None    Diet:  Regular (no restrictions)    Frequency of exercise:  2-3 days/week    Duration of exercise:  30-45 minutes    Taking medications regularly:  Yes    Medication side effects:  None    PHQ-2 Total Score: 0    Additional concerns today:  No    Likes to go for jogs.  Feels that lower abdomen bigger than before  Wondering if fatigue or more.  Over all in usual state of good health    Fasting for labs  Need refill on thyroid medications        PROBLEMS TO ADD ON...    Today's PHQ-2 Score:   PHQ-2 ( 1999 [employer]) 2/23/2022   Q1: Little interest or pleasure in doing things 0   Q2: Feeling down, depressed or hopeless 0   PHQ-2 Score 0   PHQ-2 Total Score (12-17 Years)- Positive if 3 or more points; Administer PHQ-A if positive -   Q1: Little interest or pleasure in doing things Not at all   Q2: Feeling down, depressed or hopeless Not at all   PHQ-2 Score 0       Abuse: Current or Past (Physical, Sexual or Emotional) - No  Do you feel safe in your environment? Yes    Have you ever done Advance Care Planning? (For example, a Health Directive, POLST, or a discussion with a medical provider or your loved ones about your wishes): No, advance care planning information given to patient to review.  Advanced care planning was discussed at today's visit.    Social History     Tobacco Use     Smoking status: Never Smoker     Smokeless tobacco: Never Used   Substance Use Topics     Alcohol use: Never     If you drink alcohol do you typically have >3 drinks per day or >7 drinks per week? No    No flowsheet data found.    Reviewed orders with patient.   Reviewed health maintenance and updated orders accordingly - Yes  BP Readings from Last 3 Encounters:   02/24/22 110/78   11/02/20 114/77   10/28/19 105/70    Wt Readings from Last 3 Encounters:   02/24/22 59.4 kg (131 lb)   11/02/20 58.5 kg (129 lb)   10/28/19 57.2 kg (126 lb)                    Breast Cancer Screening:    FHS-7:   Breast CA Risk Assessment (FHS-7) 2/17/2022 2/23/2022 2/23/2022   Did any of your first-degree relatives have breast or ovarian cancer? Yes Yes Yes   Did any of your relatives have bilateral breast cancer? No Unknown Unknown   Did any man in your family have breast cancer? No Unknown Unknown   Did any woman in your family have breast and ovarian cancer? Yes Yes Yes   Did any woman in your family have breast cancer before age 50 y? Yes Yes Yes   Do you have 2 or more relatives with breast and/or ovarian cancer? No No No   Do you have 2 or more relatives with breast and/or bowel cancer? No No No     click delete button to remove this line now    Pertinent mammograms are reviewed under the imaging tab.    History of abnormal Pap smear: NO - age 30-65 PAP every 5 years with negative HPV co-testing recommended  PAP / HPV Latest Ref Rng & Units 9/6/2017 8/18/2014   PAP (Historical) - NIL NIL   HPV16 NEG:Negative Negative -   HPV18 NEG:Negative Negative -   HRHPV NEG:Negative Negative -     Reviewed and updated as needed this visit by clinical staff   Tobacco  Allergies  Meds  Problems  Med Hx  Surg Hx  Fam Hx  Soc   Hx        Reviewed and updated as needed this visit by Provider   Tobacco  Allergies  Meds  Problems  Med Hx  Surg Hx  Fam Hx             Review of Systems   Constitutional: Negative for chills and fever.   HENT: Negative for congestion, ear pain, hearing loss and sore throat.    Eyes: Negative for pain and visual disturbance.   Respiratory: Negative for cough and shortness of breath.    Cardiovascular: Negative for chest pain, palpitations and peripheral edema.  "  Gastrointestinal: Negative for abdominal pain, constipation, diarrhea, heartburn, hematochezia and nausea.   Breasts:  Negative for tenderness, breast mass and discharge.   Genitourinary: Negative for dysuria, frequency, genital sores, hematuria, pelvic pain, urgency, vaginal bleeding and vaginal discharge.   Musculoskeletal: Negative for arthralgias, joint swelling and myalgias.   Skin: Negative for rash.   Neurological: Negative for dizziness, weakness, headaches and paresthesias.   Psychiatric/Behavioral: Negative for mood changes. The patient is not nervous/anxious.           OBJECTIVE:   /78   Pulse 72   Temp 97.5  F (36.4  C) (Skin)   Resp 16   Ht 1.562 m (5' 1.5\")   Wt 59.4 kg (131 lb)   LMP 02/20/2022   SpO2 100%   BMI 24.35 kg/m    Physical Exam  GENERAL: healthy, alert and no distress  EYES: Eyes grossly normal to inspection, PERRL and conjunctivae and sclerae normal  HENT: ear canals and TM's normal, nose and mouth without ulcers or lesions  NECK: no adenopathy, no asymmetry, masses, or scars and thyroid normal to palpation  RESP: lungs clear to auscultation - no rales, rhonchi or wheezes  BREAST: normal without masses, tenderness or nipple discharge and no palpable axillary masses or adenopathy  CV: regular rate and rhythm, normal S1 S2, no S3 or S4, no murmur, click or rub, no peripheral edema and peripheral pulses strong  ABDOMEN: soft, nontender, no hepatosplenomegaly, no masses and bowel sounds normal   (female): normal female external genitalia, normal urethral meatus, vaginal mucosa pink, moist, well rugated, and normal cervix/adnexa/uterus without masses or discharge  MS: no gross musculoskeletal defects noted, no edema  SKIN: no suspicious lesions or rashes  NEURO: Normal strength and tone, mentation intact and speech normal  PSYCH: mentation appears normal, affect normal/bright    Diagnostic Test Results:  Labs reviewed in Epic    ASSESSMENT/PLAN:   Chelsey was seen today for " "physical.    Diagnoses and all orders for this visit:    Routine general medical examination at a health care facility  Pap is sent- if NILM- repeat in 5 yrs  Mammogram advised-  Mom diagnosed with breast cancer at age 46.    Acquired hypothyroidism  -     TSH WITH FREE T4 REFLEX  -     levothyroxine (EUTHYROX) 100 MCG tablet; TAKE 1 TABLET BY MOUTH ONCE DAILY    Vitamin D deficiency  -     Vitamin D Deficiency  Advised to take over the counter D 2000 international unit once daily /consistently    Screening for cervical cancer  -     Pap imaged thin layer screen with HPV - recommended age 30 - 65 years (select HPV order below)  If pap is NILM/neg HPV- repeat ok in 5 yrs     Screening for diabetes mellitus  -     Glucose    Screening for lipid disorders  -     Lipid Profile (Chol, Trig, HDL, LDL calc)    Need for hepatitis C screening test  -     Hepatitis C Screen Reflex to HCV RNA Quant and Genotype    Other orders  -     INFLUENZA VACCINE IM > 6 MONTHS VALENT IIV4 (AFLURIA/FLUZONE)  -     REVIEW OF HEALTH MAINTENANCE PROTOCOL ORDERS  -     HC FLU VAC PRESRV FREE QUAD SPLIT VIR > 6 MONTHS IM ([id number])        Patient has been advised of split billing requirements and indicates understanding: Yes    COUNSELING:  Reviewed preventive health counseling, as reflected in patient instructions       Regular exercise       Healthy diet/nutrition       Vision screening       Hearing screening    Estimated body mass index is 24.35 kg/m  as calculated from the following:    Height as of this encounter: 1.562 m (5' 1.5\").    Weight as of this encounter: 59.4 kg (131 lb).        She reports that she has never smoked. She has never used smokeless tobacco.      Counseling Resources:  ATP IV Guidelines  Pooled Cohorts Equation Calculator  Breast Cancer Risk Calculator  BRCA-Related Cancer Risk Assessment: FHS-7 Tool  FRAX Risk Assessment  ICSI Preventive Guidelines  Dietary Guidelines for Americans, 2010  USDA's MyPlate  ASA " Prophylaxis  Lung CA Screening    Ruthie Palmer MD  [facility] [place]

## 2022-02-24 ENCOUNTER — OFFICE VISIT (OUTPATIENT)
Dept: FAMILY MEDICINE | Facility: CLINIC | Age: 42
End: 2022-02-24
Payer: COMMERCIAL

## 2022-02-24 VITALS
HEIGHT: 62 IN | DIASTOLIC BLOOD PRESSURE: 78 MMHG | RESPIRATION RATE: 16 BRPM | HEART RATE: 72 BPM | TEMPERATURE: 97.5 F | SYSTOLIC BLOOD PRESSURE: 110 MMHG | BODY MASS INDEX: 24.11 KG/M2 | OXYGEN SATURATION: 100 % | WEIGHT: 131 LBS

## 2022-02-24 DIAGNOSIS — Z00.00 ROUTINE GENERAL MEDICAL EXAMINATION AT A HEALTH CARE FACILITY: Primary | ICD-10-CM

## 2022-02-24 DIAGNOSIS — E03.9 ACQUIRED HYPOTHYROIDISM: ICD-10-CM

## 2022-02-24 DIAGNOSIS — E55.9 VITAMIN D DEFICIENCY: ICD-10-CM

## 2022-02-24 DIAGNOSIS — Z11.59 NEED FOR HEPATITIS C SCREENING TEST: ICD-10-CM

## 2022-02-24 DIAGNOSIS — Z12.4 SCREENING FOR CERVICAL CANCER: ICD-10-CM

## 2022-02-24 DIAGNOSIS — Z12.31 ENCOUNTER FOR SCREENING MAMMOGRAM FOR MALIGNANT NEOPLASM OF BREAST: ICD-10-CM

## 2022-02-24 DIAGNOSIS — Z13.1 SCREENING FOR DIABETES MELLITUS: ICD-10-CM

## 2022-02-24 DIAGNOSIS — Z13.220 SCREENING FOR LIPID DISORDERS: ICD-10-CM

## 2022-02-24 LAB
CHOLEST SERPL-MCNC: 236 MG/DL
DEPRECATED CALCIDIOL+CALCIFEROL SERPL-MC: 19 UG/L (ref 20–75)
FASTING STATUS PATIENT QL REPORTED: YES
FASTING STATUS PATIENT QL REPORTED: YES
GLUCOSE BLD-MCNC: 92 MG/DL (ref 70–99)
HDLC SERPL-MCNC: 46 MG/DL
LDLC SERPL CALC-MCNC: 150 MG/DL
NONHDLC SERPL-MCNC: 190 MG/DL
TRIGL SERPL-MCNC: 201 MG/DL
TSH SERPL DL<=0.005 MIU/L-ACNC: 0.57 MU/L (ref 0.4–4)

## 2022-02-24 PROCEDURE — G0145 SCR C/V CYTO,THINLAYER,RESCR: HCPCS | Performed by: FAMILY MEDICINE

## 2022-02-24 PROCEDURE — 82947 ASSAY GLUCOSE BLOOD QUANT: CPT | Performed by: FAMILY MEDICINE

## 2022-02-24 PROCEDURE — 80061 LIPID PANEL: CPT | Performed by: FAMILY MEDICINE

## 2022-02-24 PROCEDURE — 90686 IIV4 VACC NO PRSV 0.5 ML IM: CPT | Performed by: FAMILY MEDICINE

## 2022-02-24 PROCEDURE — 87624 HPV HI-RISK TYP POOLED RSLT: CPT | Performed by: FAMILY MEDICINE

## 2022-02-24 PROCEDURE — 86803 HEPATITIS C AB TEST: CPT | Performed by: FAMILY MEDICINE

## 2022-02-24 PROCEDURE — 90471 IMMUNIZATION ADMIN: CPT | Performed by: FAMILY MEDICINE

## 2022-02-24 PROCEDURE — 84443 ASSAY THYROID STIM HORMONE: CPT | Performed by: FAMILY MEDICINE

## 2022-02-24 PROCEDURE — 99395 PREV VISIT EST AGE 18-39: CPT | Mod: 25 | Performed by: FAMILY MEDICINE

## 2022-02-24 PROCEDURE — 82306 VITAMIN D 25 HYDROXY: CPT | Performed by: FAMILY MEDICINE

## 2022-02-24 PROCEDURE — 36415 COLL VENOUS BLD VENIPUNCTURE: CPT | Performed by: FAMILY MEDICINE

## 2022-02-24 RX ORDER — LEVOTHYROXINE SODIUM 100 UG/1
TABLET ORAL
Qty: 90 TABLET | Refills: 3 | Status: SHIPPED | OUTPATIENT
Start: 2022-02-24 | End: 2023-03-13

## 2022-02-24 ASSESSMENT — ENCOUNTER SYMPTOMS
HEADACHES: 0
SORE THROAT: 0
DYSURIA: 0
BREAST MASS: 0
CHILLS: 0
FEVER: 0
JOINT SWELLING: 0
FREQUENCY: 0
HEMATOCHEZIA: 0
PARESTHESIAS: 0
MYALGIAS: 0
NERVOUS/ANXIOUS: 0
ABDOMINAL PAIN: 0
NAUSEA: 0
SHORTNESS OF BREATH: 0
ARTHRALGIAS: 0
HEMATURIA: 0
DIARRHEA: 0
HEARTBURN: 0
WEAKNESS: 0
PALPITATIONS: 0
COUGH: 0
CONSTIPATION: 0
EYE PAIN: 0
DIZZINESS: 0

## 2022-02-24 NOTE — RESULT ENCOUNTER NOTE
Dear Chelsey Bolanos    Pleasure meeting you in  the clinic on 2/24/2022   -Glucose (diabetic screening test) is normal.  -TSH (thyroid stimulating hormone) level is normal which indicates normal thyroid function.    -LDL(bad) cholesterol level is elevated, HDL(good) cholesterol level is low and your triglycerides are elevated which can increase your heart disease risk.  A diet high in fat and simple carbohydrates, genetics and being overweight can contribute to this. ADVISE: exercising 150 minutes of aerobic exercise per week (30 minutes for 5 days per week or 50 minutes for 3 days per week are options), eating a low saturated fat/low carbohydrate diet, and omega-3 fatty acids (fish oil) 5962-4020 mg daily are helpful to improve this. In 12 months, you should recheck your fasting cholesterol panel by scheduling a lab-only appointment.    Please keep us posted with questions or concerns .      Best Regards,    Ruthie Palmer MD  [facility]  [phone]

## 2022-02-25 LAB — HCV AB SERPL QL IA: NONREACTIVE

## 2022-02-28 LAB
BKR LAB AP GYN ADEQUACY: NORMAL
BKR LAB AP GYN INTERPRETATION: NORMAL
BKR LAB AP HPV REFLEX: NORMAL
BKR LAB AP LMP: NORMAL
BKR LAB AP PREVIOUS ABNORMAL: NORMAL
PATH REPORT.COMMENTS IMP SPEC: NORMAL
PATH REPORT.COMMENTS IMP SPEC: NORMAL
PATH REPORT.RELEVANT HX SPEC: NORMAL

## 2022-03-01 LAB
HUMAN PAPILLOMA VIRUS 16 DNA: NEGATIVE
HUMAN PAPILLOMA VIRUS 18 DNA: NEGATIVE
HUMAN PAPILLOMA VIRUS FINAL DIAGNOSIS: NORMAL
HUMAN PAPILLOMA VIRUS OTHER HR: NEGATIVE

## 2022-09-10 ENCOUNTER — HEALTH MAINTENANCE LETTER (OUTPATIENT)
Age: 42
End: 2022-09-10

## 2022-12-16 ENCOUNTER — IMMUNIZATION (OUTPATIENT)
Dept: NURSING | Facility: CLINIC | Age: 42
End: 2022-12-16
Payer: COMMERCIAL

## 2022-12-16 PROCEDURE — 90471 IMMUNIZATION ADMIN: CPT

## 2022-12-16 PROCEDURE — 0124A COVID-19 VACCINE BIVALENT BOOSTER 12+ (PFIZER): CPT

## 2022-12-16 PROCEDURE — 91312 COVID-19 VACCINE BIVALENT BOOSTER 12+ (PFIZER): CPT

## 2022-12-16 PROCEDURE — 90686 IIV4 VACC NO PRSV 0.5 ML IM: CPT

## 2023-03-06 DIAGNOSIS — E03.9 ACQUIRED HYPOTHYROIDISM: ICD-10-CM

## 2023-03-07 ENCOUNTER — MYC MEDICAL ADVICE (OUTPATIENT)
Dept: FAMILY MEDICINE | Facility: CLINIC | Age: 43
End: 2023-03-07
Payer: COMMERCIAL

## 2023-03-13 RX ORDER — LEVOTHYROXINE SODIUM 100 UG/1
TABLET ORAL
Qty: 90 TABLET | Refills: 0 | Status: SHIPPED | OUTPATIENT
Start: 2023-03-13 | End: 2023-06-13

## 2023-03-14 NOTE — TELEPHONE ENCOUNTER
Has appointment scheduled with me later this month.  Refill sent.  Labs will be obtained at follow-up appointment.    JEREMY

## 2023-03-30 ENCOUNTER — OFFICE VISIT (OUTPATIENT)
Dept: FAMILY MEDICINE | Facility: CLINIC | Age: 43
End: 2023-03-30
Payer: COMMERCIAL

## 2023-03-30 VITALS
WEIGHT: 126 LBS | BODY MASS INDEX: 23.79 KG/M2 | SYSTOLIC BLOOD PRESSURE: 108 MMHG | OXYGEN SATURATION: 100 % | HEART RATE: 91 BPM | TEMPERATURE: 97.9 F | DIASTOLIC BLOOD PRESSURE: 76 MMHG | HEIGHT: 61 IN

## 2023-03-30 DIAGNOSIS — M26.609 TMJ (TEMPOROMANDIBULAR JOINT SYNDROME): ICD-10-CM

## 2023-03-30 DIAGNOSIS — Z13.6 CARDIOVASCULAR SCREENING; LDL GOAL LESS THAN 160: ICD-10-CM

## 2023-03-30 DIAGNOSIS — M77.11 RIGHT LATERAL EPICONDYLITIS: ICD-10-CM

## 2023-03-30 DIAGNOSIS — Z12.31 ENCOUNTER FOR SCREENING MAMMOGRAM FOR BREAST CANCER: ICD-10-CM

## 2023-03-30 DIAGNOSIS — Z13.1 SCREENING FOR DIABETES MELLITUS: ICD-10-CM

## 2023-03-30 DIAGNOSIS — E03.9 ACQUIRED HYPOTHYROIDISM: ICD-10-CM

## 2023-03-30 DIAGNOSIS — R53.83 FATIGUE, UNSPECIFIED TYPE: ICD-10-CM

## 2023-03-30 DIAGNOSIS — Z00.00 ROUTINE GENERAL MEDICAL EXAMINATION AT A HEALTH CARE FACILITY: Primary | ICD-10-CM

## 2023-03-30 DIAGNOSIS — E55.9 VITAMIN D DEFICIENCY: ICD-10-CM

## 2023-03-30 DIAGNOSIS — Z23 NEED FOR TDAP VACCINATION: ICD-10-CM

## 2023-03-30 LAB
ALBUMIN SERPL-MCNC: 3.8 G/DL (ref 3.4–5)
ALP SERPL-CCNC: 70 U/L (ref 40–150)
ALT SERPL W P-5'-P-CCNC: 18 U/L (ref 0–50)
ANION GAP SERPL CALCULATED.3IONS-SCNC: 4 MMOL/L (ref 3–14)
AST SERPL W P-5'-P-CCNC: 10 U/L (ref 0–45)
BILIRUB SERPL-MCNC: 0.4 MG/DL (ref 0.2–1.3)
BUN SERPL-MCNC: 11 MG/DL (ref 7–30)
CALCIUM SERPL-MCNC: 9 MG/DL (ref 8.5–10.1)
CHLORIDE BLD-SCNC: 110 MMOL/L (ref 94–109)
CHOLEST SERPL-MCNC: 186 MG/DL
CO2 SERPL-SCNC: 26 MMOL/L (ref 20–32)
CREAT SERPL-MCNC: 0.71 MG/DL (ref 0.52–1.04)
ERYTHROCYTE [DISTWIDTH] IN BLOOD BY AUTOMATED COUNT: 12.3 % (ref 10–15)
FASTING STATUS PATIENT QL REPORTED: NO
FERRITIN SERPL-MCNC: 45 NG/ML (ref 12–150)
GFR SERPL CREATININE-BSD FRML MDRD: >90 ML/MIN/1.73M2
GLUCOSE BLD-MCNC: 102 MG/DL (ref 70–99)
HCT VFR BLD AUTO: 35.1 % (ref 35–47)
HDLC SERPL-MCNC: 45 MG/DL
HGB BLD-MCNC: 11.8 G/DL (ref 11.7–15.7)
LDLC SERPL CALC-MCNC: 121 MG/DL
MCH RBC QN AUTO: 29.8 PG (ref 26.5–33)
MCHC RBC AUTO-ENTMCNC: 33.6 G/DL (ref 31.5–36.5)
MCV RBC AUTO: 89 FL (ref 78–100)
NONHDLC SERPL-MCNC: 141 MG/DL
PLATELET # BLD AUTO: 277 10E3/UL (ref 150–450)
POTASSIUM BLD-SCNC: 4.6 MMOL/L (ref 3.4–5.3)
PROT SERPL-MCNC: 7.1 G/DL (ref 6.8–8.8)
RBC # BLD AUTO: 3.96 10E6/UL (ref 3.8–5.2)
SODIUM SERPL-SCNC: 140 MMOL/L (ref 133–144)
T4 FREE SERPL-MCNC: 1.07 NG/DL (ref 0.76–1.46)
TRIGL SERPL-MCNC: 98 MG/DL
TSH SERPL DL<=0.005 MIU/L-ACNC: 4.06 MU/L (ref 0.4–4)
WBC # BLD AUTO: 6 10E3/UL (ref 4–11)

## 2023-03-30 PROCEDURE — 84443 ASSAY THYROID STIM HORMONE: CPT | Performed by: FAMILY MEDICINE

## 2023-03-30 PROCEDURE — 99396 PREV VISIT EST AGE 40-64: CPT | Mod: 25 | Performed by: FAMILY MEDICINE

## 2023-03-30 PROCEDURE — 82306 VITAMIN D 25 HYDROXY: CPT | Performed by: FAMILY MEDICINE

## 2023-03-30 PROCEDURE — 36415 COLL VENOUS BLD VENIPUNCTURE: CPT | Performed by: FAMILY MEDICINE

## 2023-03-30 PROCEDURE — 80061 LIPID PANEL: CPT | Performed by: FAMILY MEDICINE

## 2023-03-30 PROCEDURE — 90715 TDAP VACCINE 7 YRS/> IM: CPT | Performed by: FAMILY MEDICINE

## 2023-03-30 PROCEDURE — 85027 COMPLETE CBC AUTOMATED: CPT | Performed by: FAMILY MEDICINE

## 2023-03-30 PROCEDURE — 80053 COMPREHEN METABOLIC PANEL: CPT | Performed by: FAMILY MEDICINE

## 2023-03-30 PROCEDURE — 82728 ASSAY OF FERRITIN: CPT | Performed by: FAMILY MEDICINE

## 2023-03-30 PROCEDURE — 99214 OFFICE O/P EST MOD 30 MIN: CPT | Mod: 25 | Performed by: FAMILY MEDICINE

## 2023-03-30 PROCEDURE — 90471 IMMUNIZATION ADMIN: CPT | Performed by: FAMILY MEDICINE

## 2023-03-30 PROCEDURE — 84439 ASSAY OF FREE THYROXINE: CPT | Performed by: FAMILY MEDICINE

## 2023-03-30 ASSESSMENT — ENCOUNTER SYMPTOMS
CONSTIPATION: 0
DYSURIA: 0
ARTHRALGIAS: 0
HEMATOCHEZIA: 0
ABDOMINAL PAIN: 0
DIZZINESS: 0
COUGH: 0
PARESTHESIAS: 0
MYALGIAS: 0
BREAST MASS: 0
JOINT SWELLING: 0
SHORTNESS OF BREATH: 0
HEADACHES: 0
PALPITATIONS: 0
FEVER: 0
NAUSEA: 0
SORE THROAT: 0
EYE PAIN: 0
WEAKNESS: 0
NERVOUS/ANXIOUS: 0
CHILLS: 0
FREQUENCY: 0
HEMATURIA: 0
DIARRHEA: 0
HEARTBURN: 0

## 2023-03-30 NOTE — NURSING NOTE
Prior to immunization administration, verified patients identity using patient s name and date of birth. Please see Immunization Activity for additional information.     Screening Questionnaire for Adult Immunization    Are you sick today?   No   Do you have allergies to medications, food, a vaccine component or latex?   No   Have you ever had a serious reaction after receiving a vaccination?   No   Do you have a long-term health problem with heart, lung, kidney, or metabolic disease (e.g., diabetes), asthma, a blood disorder, no spleen, complement component deficiency, a cochlear implant, or a spinal fluid leak?  Are you on long-term aspirin therapy?   No   Do you have cancer, leukemia, HIV/AIDS, or any other immune system problem?   No   Do you have a parent, brother, or sister with an immune system problem?   No   In the past 3 months, have you taken medications that affect  your immune system, such as prednisone, other steroids, or anticancer drugs; drugs for the treatment of rheumatoid arthritis, Crohn s disease, or psoriasis; or have you had radiation treatments?   No   Have you had a seizure, or a brain or other nervous system problem?   No   During the past year, have you received a transfusion of blood or blood    products, or been given immune (gamma) globulin or antiviral drug?   No   For women: Are you pregnant or is there a chance you could become       pregnant during the next month?   No   Have you received any vaccinations in the past 4 weeks?   No     Immunization questionnaire answers were all negative.      Injection of Tdap given by Ariana Salas MA. Patient instructed to remain in clinic for 15 minutes afterwards, and to report any adverse reactions.     Screening performed by Ariana Salas MA on 3/30/2023 at 7:41 AM.

## 2023-03-30 NOTE — PATIENT INSTRUCTIONS
Labs today.  Refill thyroid medication will be sent when results return.    Update Tetanus vaccine today.    Mammogram recommended.   You can call [phone] to schedule [facility] [place]     For the elbow pain - use a tennis elbow band.  If you find out that insurance will cover the band here, send me a [employer] message and we can have one available at the .       Use the claritin as needed for congestion/sneezing.  The pain in the jaw/headache is related to TMJ.  You can use the ibuprofen but if occurring regularly you can use a medication daily to prevent symptoms.  Follow up to discuss this if needed.    Preventive Health Recommendations  Female Ages 40 to 49    Yearly exam:   See your health care provider every year in order to  Review health changes.   Discuss preventive care.    Review your medicines if your doctor prescribed any.    Get a Pap test every three years (unless you have an abnormal result and your provider advises testing more often).    If you get Pap tests with HPV test, you only need to test every 5 years, unless you have an abnormal result. You do not need a Pap test if your uterus was removed (hysterectomy) and you have not had cancer.    You should be tested each year for STDs (sexually transmitted diseases), if you're at risk.   Ask your doctor if you should have a mammogram.    Have a colonoscopy (test for colon cancer) if someone in your family has had colon cancer or polyps before age 50.     Have a cholesterol test every 5 years.     Have a diabetes test (fasting glucose) after age 45. If you are at risk for diabetes, you should have this test every 3 years.    Shots: Get a flu shot each year. Get a tetanus shot every 10 years.     Nutrition:   Eat at least 5 servings of fruits and vegetables each day.  Eat whole-grain bread, whole-wheat pasta and brown rice instead of white grains and rice.  Get adequate Calcium and Vitamin  D.      Lifestyle  Exercise at least 150 minutes a week (an average of 30 minutes a day, 5 days a week). This will help you control your weight and prevent disease.  Limit alcohol to one drink per day.  No smoking.   Wear sunscreen to prevent skin cancer.  See your dentist every six months for an exam and cleaning.

## 2023-03-30 NOTE — PROGRESS NOTES
SUBJECTIVE:   CC: Chelsey is an 42 year old who presents for preventive health visit.   Additional Questions 3/30/2023   Roomed by Ariana MCKNIGHT   Accompanied by Self     Patient Reported Additional Medications 3/30/2023   Patient reports taking the following new medications None     Patient has been advised of split billing requirements and indicates understanding: Yes  Healthy Habits:     Getting at least 3 servings of Calcium per day:  Yes    Bi-annual eye exam:  Yes    Dental care twice a year:  Yes    Sleep apnea or symptoms of sleep apnea:  Daytime drowsiness    Diet:  Regular (no restrictions)    Frequency of exercise:  2-3 days/week    Duration of exercise:  15-30 minutes    Taking medications regularly:  Yes    Medication side effects:  None    PHQ-2 Total Score: 0    Additional concerns today:  No  walking tolerated well,          Hypothyroidism Follow-up      Since last visit, patient describes the following symptoms: Weight stable, no hair loss, no skin changes, no constipation, no loose stools and fatigue      Today's PHQ-2 Score:   PHQ-2 ( 1999 [employer]) 3/30/2023   Q1: Little interest or pleasure in doing things 0   Q2: Feeling down, depressed or hopeless 0   PHQ-2 Score 0   PHQ-2 Total Score (12-17 Years)- Positive if 3 or more points; Administer PHQ-A if positive -   Q1: Little interest or pleasure in doing things Not at all   Q2: Feeling down, depressed or hopeless Not at all   PHQ-2 Score 0           Social History     Tobacco Use     Smoking status: Never     Smokeless tobacco: Never   Substance Use Topics     Alcohol use: Never         Alcohol Use 3/30/2023   Prescreen: >3 drinks/day or >7 drinks/week? Not Applicable     Reviewed orders with patient.  Reviewed health maintenance and updated orders accordingly - Yes  BP Readings from Last 3 Encounters:   03/30/23 108/76   02/24/22 110/78   11/02/20 114/77    Wt Readings from Last 3 Encounters:   03/30/23 57.2 kg (126 lb)   02/24/22 59.4 kg (131 lb)    20 58.5 kg (129 lb)                    Breast Cancer Screening:    FHS-7:   Breast CA Risk Assessment (FHS-7) 2022 2022 2022 3/30/2023   Did any of your first-degree relatives have breast or ovarian cancer? Yes Yes Yes Yes   Did any of your relatives have bilateral breast cancer? No Unknown Unknown No   Did any man in your family have breast cancer? No Unknown Unknown No   Did any woman in your family have breast and ovarian cancer? Yes Yes Yes No   Did any woman in your family have breast cancer before age 50 y? Yes Yes Yes Yes   Do you have 2 or more relatives with breast and/or ovarian cancer? No No No Unknown   Do you have 2 or more relatives with breast and/or bowel cancer? No No No Unknown       Mammogram Screening - Offered annual screening and updated Health Maintenance for [place] plan based on risk factor consideration    Pertinent mammograms are reviewed under the imaging tab.    History of abnormal Pap smear: NO - age 30-65 PAP every 5 years with negative HPV co-testing recommended  PAP / HPV Latest Ref Rng & Units 2022   PAP   Negative for Intraepithelial Lesion or Malignancy (NILM) - -   PAP (Historical) - - NIL NIL   HPV16 Negative Negative Negative -   HPV18 Negative Negative Negative -   HRHPV Negative Negative Negative -     Reviewed and updated as needed this visit by clinical staff   Tobacco  Allergies  Meds   Med Hx  Surg Hx  Fam Hx          Reviewed and updated as needed this visit by Provider   Tobacco  Allergies  Meds   Med Hx  Surg Hx  Fam Hx         History reviewed. No pertinent past medical history.   Past Surgical History:   Procedure Laterality Date     GYN SURGERY      C section x 2     MAMMOPLASTY REDUCTION BILATERAL Bilateral 2019    Procedure: MAMMOPLASTY, REDUCTION, BILATERAL;  Surgeon: ROXANNA French MD;  Location: [facility] OR     OB History    Para Term  AB Living   2 2 2 0 0 2   SAB IAB Ectopic  "Multiple Live Births   0 0 0 0 2      # Outcome Date GA Lbr Alexandro/2nd Weight Sex Delivery Anes PTL Lv   2 Term     M CS-LTranv   HALEY   1 Term     M CS-LTranv   HALEY      Complications: Fetal Intolerance       Review of Systems   Constitutional: Negative for chills and fever.   HENT: Negative for congestion, ear pain, hearing loss and sore throat.    Eyes: Negative for pain and visual disturbance.   Respiratory: Negative for cough and shortness of breath.    Cardiovascular: Negative for chest pain, palpitations and peripheral edema.   Gastrointestinal: Negative for abdominal pain, constipation, diarrhea, heartburn, hematochezia and nausea.   Breasts:  Negative for tenderness, breast mass and discharge.   Genitourinary: Negative for dysuria, frequency, genital sores, hematuria, pelvic pain, urgency, vaginal bleeding and vaginal discharge.   Musculoskeletal: Negative for arthralgias, joint swelling and myalgias.   Skin: Negative for rash.   Neurological: Negative for dizziness, weakness, headaches and paresthesias.   Psychiatric/Behavioral: Negative for mood changes. The patient is not nervous/anxious.      Teeth pain - no cavities.  Sneezing frequently.  Pain on sides of head to generalized.      Right elbow - pain, few months pain - at night.  Previously weight lifting but stopped due to pain and has worsened recently.      OBJECTIVE:   /76 (BP Location: Left arm, Patient Position: Sitting, Cuff Size: Adult Regular)   Pulse 91   Temp 97.9  F (36.6  C) (Oral)   Ht 1.549 m (5' 1\")   Wt 57.2 kg (126 lb)   LMP 03/11/2023 (Exact Date)   SpO2 100%   BMI 23.81 kg/m    Physical Exam  GENERAL: healthy, alert and no distress  EYES: Eyes grossly normal to inspection, PERRL and conjunctivae and sclerae normal  HENT: normal cephalic/atraumatic, ear canals and TM's normal, nose and mouth without ulcers or lesions, nasal mucosa edematous , rhinorrhea post nasal drainage, oropharynx clear, oral mucous membranes moist and " clicking bilaterally with opening jaw at TMJ  NECK: no adenopathy, no asymmetry, masses, or scars and thyroid normal to palpation  RESP: lungs clear to auscultation - no rales, rhonchi or wheezes  BREAST: normal without masses, tenderness or nipple discharge and no palpable axillary masses or adenopathy  CV: regular rate and rhythm, normal S1 S2, no S3 or S4, no murmur, click or rub, no peripheral edema and peripheral pulses strong  ABDOMEN: soft, nontender, no hepatosplenomegaly, no masses and bowel sounds normal   (female): normal female external genitalia, normal urethral meatus, vaginal mucosa pink, moist, well rugated, and normal cervix/adnexa/uterus without masses or discharge  MS: no gross musculoskeletal defects noted, no edema  SKIN: no suspicious lesions or rashes  NEURO: Normal strength and tone, mentation intact and speech normal  PSYCH: mentation appears normal, affect normal/bright    Diagnostic Test Results:  Labs reviewed in Epic  Results for orders placed or performed in visit on 03/30/23   TSH WITH FREE T4 REFLEX     Status: Abnormal   Result Value Ref Range    TSH 4.06 (H) 0.40 - 4.00 mU/L   Lipid panel reflex to direct LDL Non-fasting     Status: Abnormal   Result Value Ref Range    Cholesterol 186 <200 mg/dL    Triglycerides 98 <150 mg/dL    Direct Measure HDL 45 (L) >=50 mg/dL    LDL Cholesterol Calculated 121 (H) <=100 mg/dL    Non HDL Cholesterol 141 (H) <130 mg/dL    Patient Fasting > 8hrs? No     Narrative    Cholesterol  Desirable:  <200 mg/dL    Triglycerides  Normal:  Less than 150 mg/dL  Borderline High:  150-199 mg/dL  High:  200-499 mg/dL  Very High:  Greater than or equal to 500 mg/dL    Direct Measure HDL  Female:  Greater than or equal to 50 mg/dL   Male:  Greater than or equal to 40 mg/dL    LDL Cholesterol  Desirable:  <100mg/dL  Above Desirable:  100-129 mg/dL   Borderline High:  130-159 mg/dL   High:  160-189 mg/dL   Very High:  >= 190 mg/dL    Non HDL Cholesterol  Desirable:   130 mg/dL  Above Desirable:  130-159 mg/dL  Borderline High:  160-189 mg/dL  High:  190-219 mg/dL  Very High:  Greater than or equal to 220 mg/dL   Comprehensive metabolic panel (BMP + Alb, Alk Phos, ALT, AST, Total. Bili, TP)     Status: Abnormal   Result Value Ref Range    Sodium 140 133 - 144 mmol/L    Potassium 4.6 3.4 - 5.3 mmol/L    Chloride 110 (H) 94 - 109 mmol/L    Carbon Dioxide (CO2) 26 20 - 32 mmol/L    Anion Gap 4 3 - 14 mmol/L    Urea Nitrogen 11 7 - 30 mg/dL    Creatinine 0.71 0.52 - 1.04 mg/dL    Calcium 9.0 8.5 - 10.1 mg/dL    Glucose 102 (H) 70 - 99 mg/dL    Alkaline Phosphatase 70 40 - 150 U/L    AST 10 0 - 45 U/L    ALT 18 0 - 50 U/L    Protein Total 7.1 6.8 - 8.8 g/dL    Albumin 3.8 3.4 - 5.0 g/dL    Bilirubin Total 0.4 0.2 - 1.3 mg/dL    GFR Estimate >90 >60 mL/min/1.73m2   CBC with platelets     Status: Normal   Result Value Ref Range    WBC Count 6.0 4.0 - 11.0 10e3/uL    RBC Count 3.96 3.80 - 5.20 10e6/uL    Hemoglobin 11.8 11.7 - 15.7 g/dL    Hematocrit 35.1 35.0 - 47.0 %    MCV 89 78 - 100 fL    MCH 29.8 26.5 - 33.0 pg    MCHC 33.6 31.5 - 36.5 g/dL    RDW 12.3 10.0 - 15.0 %    Platelet Count 277 150 - 450 10e3/uL   Ferritin     Status: Normal   Result Value Ref Range    Ferritin 45 12 - 150 ng/mL   T4 free     Status: Normal   Result Value Ref Range    Free T4 1.07 0.76 - 1.46 ng/dL       ASSESSMENT/PLAN:   (Z00.00) Routine general medical examination at a health care facility  (primary encounter diagnosis)  Comment: fasting  Plan: Screening and preventative care discussed    (E03.9) Acquired hypothyroidism  Comment:   Plan: TSH WITH FREE T4 REFLEX        Normal free T4 with borderline high TSH.  She does have a history of fatigue you so may benefit from an increased dose of her thyroid hormone.  Vitamin D level is still pending and that is certainly a possible source for her fatigue as well.  We will await the results on her vitamin D level and if normal I would recommend an increase in  thyroid medication to 112 mcg daily with a recheck in 12 weeks.  If vitamin D level is low we will replace this and monitor her fatigue symptoms.    (M26.609) TMJ (temporomandibular joint syndrome)  Comment: Contributing to/causing the headache/upper teeth jaw symptoms  Plan: She does have a mouthguard that she wears at night.  We discussed that I do not see signs of a sinus infection.  Discomfort is from the temporomandibular joint extending up into the head.  She does not feel the symptoms are significant enough to take medication for it but we discussed the use of muscle relaxants if symptoms should worsen.    Right lateral epicondylitis  Tennis elbow band was recommended.  She will attempt to get this over-the-counter but if she finds that insurance covers it we will have a band available for her at the .  She will reach out to me if that is needed.  Consider use of NSAID if ongoing pain symptoms noted.      (R53.83) Fatigue, unspecified type  Comment:   Plan: CBC with platelets, Ferritin        Normal CBC and ferritin.  Vitamin D level is pending.  Borderline abnormal thyroid testing may warrant change in dose of medication depending on vitamin D level.    (E55.9) Vitamin D deficiency  Comment:   Plan: Vitamin D Deficiency        Level pending    (Z13.6) CARDIOVASCULAR SCREENING; LDL GOAL LESS THAN 160  Comment: The 10-year ASCVD risk score (Balwinder WATTS, et al., 2019) is: 0.5%    Values used to calculate the score:      Age: 42 years      Sex: Female      Is Non- : No      Diabetic: No      Tobacco smoker: No      Systolic Blood Pressure: 108 mmHg      Is BP treated: No      HDL Cholesterol: 45 mg/dL      Total Cholesterol: 186 mg/dL   Plan: Lipid panel reflex to direct LDL Non-fasting        Low overall risk for heart disease.  Healthy diet and exercise recommended    (Z12.31) Encounter for screening mammogram for breast cancer  Comment:   Plan: MA Screen Bilateral w/Alberto             (Z13.1) Screening for diabetes mellitus  Comment:   Plan: Comprehensive metabolic panel (BMP + Alb, Alk         Phos, ALT, AST, Total. Bili, TP)        Prediabetic blood sugar noted today.  Diet and exercise  recommended    (Z23) Need for Tdap vaccination  Comment:   Plan: TDAP VACCINE (Adacel, Boostrix)        Update vaccine today      Patient has been advised of split billing requirements and indicates understanding: Yes      COUNSELING:  Reviewed preventive health counseling, as reflected in patient instructions       Regular exercise       Healthy diet/nutrition       Vision screening       Immunizations    Vaccinated for: TDAP             Contraception       Osteoporosis prevention/bone health       Safe sex practices/STD prevention        She reports that she has never smoked. She has never used smokeless tobacco.      Penelope Cortes MD  [facility]      Patient Instructions   Labs today.  Refill thyroid medication will be sent when results return.    Update Tetanus vaccine today.    Mammogram recommended.   You can [phone] [place]     For the elbow pain - use a tennis elbow band.  If you find out that insurance will cover the band here, send me a [employer] message and we can have one available at the .       Use the claritin as needed for congestion/sneezing.  The pain in the jaw/headache is related to TMJ.  You can use the ibuprofen but if occurring regularly you can use a medication daily to prevent symptoms.  Follow up to discuss this if needed.

## 2023-03-31 NOTE — RESULT ENCOUNTER NOTE
"The 10-year ASCVD risk score (Balwinder WATTS, et al., 2019) is: 0.5%    Values used to calculate the score:      Age: 42 years      Sex: Female      Is Non- : No      Diabetic: No      Tobacco smoker: No      Systolic Blood Pressure: 108 mmHg      Is BP treated: No      HDL Cholesterol: 45 mg/dL      Total Cholesterol: 186 mg/dL    Your thyroid testing indicates normal active thyroid hormone (free T4) although the TSH is borderline high which could indicate you would benefit from an increased dose of thyroid hormone.  Because the free T4 is normal you could remain on the current dose of thyroid medication but with your symptoms of fatigue and increase to 112 mcg daily could also be considered.  Your vitamin D level is still pending and if that is normal I would recommend the increase in dosage to 112 mcg.  If your vitamin D level is low this could also be contributing to the fatigue and an increase in that supplement would be recommended first.  Your kidney function and liver testing are both normal.  Your blood sugar is borderline elevated.  This is in the \"prediabetic\" range.  Exercise and limiting carbohydrate and sugars in diet can be helpful at preventing progression to diabetes.   Your cholesterol level shows a above desirable LDL and a low HDL.  This is improved from a year ago.  I would recommend healthy diet and exercise to manage this.  Your overall risk for heart disease remains low when considering these results and other risk factors.  Your blood cell counts are normal.  You do not have anemia and your iron stores (ferritin) are normal as well.  Please call or MyChart message me if you have any questions.      PSK"

## 2023-04-03 LAB — DEPRECATED CALCIDIOL+CALCIFEROL SERPL-MC: 17 UG/L (ref 20–75)

## 2023-04-04 ENCOUNTER — MYC MEDICAL ADVICE (OUTPATIENT)
Dept: FAMILY MEDICINE | Facility: CLINIC | Age: 43
End: 2023-04-04
Payer: COMMERCIAL

## 2023-04-04 DIAGNOSIS — E55.9 VITAMIN D DEFICIENCY: Primary | ICD-10-CM

## 2023-04-04 NOTE — RESULT ENCOUNTER NOTE
Your vitamin D level is very low.  This can contribute to symptoms of fatigue, depression, and muscle pain.   I would recommend an increase in vitamin D supplement to 50,000  IU weekly for 8 weeks and then  2000 IU daily.   The 50,000 IU dosing prescription is sent to your pharmacy.  The 2000 IU dose can be obtained over the counter of as a prescription.  If you desire a prescription, please call the office and this will be sent to your pharmacy.     Please call or [employer] - /l?k â€¢ splik/hart message me if you have any questions.  JEREMY

## 2023-06-13 DIAGNOSIS — E03.9 ACQUIRED HYPOTHYROIDISM: ICD-10-CM

## 2023-06-13 RX ORDER — LEVOTHYROXINE SODIUM 100 UG/1
TABLET ORAL
Qty: 90 TABLET | Refills: 2 | Status: SHIPPED | OUTPATIENT
Start: 2023-06-13 | End: 2024-02-21

## 2024-02-18 ENCOUNTER — HEALTH MAINTENANCE LETTER (OUTPATIENT)
Age: 44
End: 2024-02-18

## 2024-02-21 DIAGNOSIS — E03.9 ACQUIRED HYPOTHYROIDISM: ICD-10-CM

## 2024-02-21 RX ORDER — LEVOTHYROXINE SODIUM 100 UG/1
100 TABLET ORAL DAILY
Qty: 30 TABLET | Refills: 0 | Status: SHIPPED | OUTPATIENT
Start: 2024-02-21 | End: 2024-04-25

## 2024-02-29 ENCOUNTER — OFFICE VISIT (OUTPATIENT)
Dept: FAMILY MEDICINE | Facility: CLINIC | Age: 44
End: 2024-02-29
Payer: COMMERCIAL

## 2024-02-29 VITALS
TEMPERATURE: 97.9 F | SYSTOLIC BLOOD PRESSURE: 96 MMHG | OXYGEN SATURATION: 99 % | WEIGHT: 127.38 LBS | HEART RATE: 80 BPM | RESPIRATION RATE: 16 BRPM | DIASTOLIC BLOOD PRESSURE: 68 MMHG | BODY MASS INDEX: 24.05 KG/M2 | HEIGHT: 61 IN

## 2024-02-29 DIAGNOSIS — Z23 VACCINE FOR VIRAL HEPATITIS: ICD-10-CM

## 2024-02-29 DIAGNOSIS — Z86.2 HISTORY OF ANEMIA: ICD-10-CM

## 2024-02-29 DIAGNOSIS — Z13.6 CARDIOVASCULAR SCREENING; LDL GOAL LESS THAN 160: ICD-10-CM

## 2024-02-29 DIAGNOSIS — Z23 COVID-19 VACCINE ADMINISTERED: ICD-10-CM

## 2024-02-29 DIAGNOSIS — Z00.00 ROUTINE GENERAL MEDICAL EXAMINATION AT A HEALTH CARE FACILITY: Primary | ICD-10-CM

## 2024-02-29 DIAGNOSIS — Z13.1 SCREENING FOR DIABETES MELLITUS: ICD-10-CM

## 2024-02-29 DIAGNOSIS — L65.9 HAIR LOSS: ICD-10-CM

## 2024-02-29 DIAGNOSIS — E03.9 ACQUIRED HYPOTHYROIDISM: ICD-10-CM

## 2024-02-29 DIAGNOSIS — Z23 FLU VACCINE NEED: ICD-10-CM

## 2024-02-29 DIAGNOSIS — Z12.31 ENCOUNTER FOR SCREENING MAMMOGRAM FOR BREAST CANCER: ICD-10-CM

## 2024-02-29 DIAGNOSIS — N92.0 SPOTTING BETWEEN MENSES: ICD-10-CM

## 2024-02-29 DIAGNOSIS — E55.9 VITAMIN D DEFICIENCY: ICD-10-CM

## 2024-02-29 LAB
ALBUMIN SERPL BCG-MCNC: 4.5 G/DL (ref 3.5–5.2)
ALP SERPL-CCNC: 73 U/L (ref 40–150)
ALT SERPL W P-5'-P-CCNC: 16 U/L (ref 0–50)
ANION GAP SERPL CALCULATED.3IONS-SCNC: 9 MMOL/L (ref 7–15)
AST SERPL W P-5'-P-CCNC: 18 U/L (ref 0–45)
BILIRUB SERPL-MCNC: 0.7 MG/DL
BUN SERPL-MCNC: 11.2 MG/DL (ref 6–20)
CALCIUM SERPL-MCNC: 9.3 MG/DL (ref 8.6–10)
CHLORIDE SERPL-SCNC: 103 MMOL/L (ref 98–107)
CHOLEST SERPL-MCNC: 224 MG/DL
CREAT SERPL-MCNC: 0.69 MG/DL (ref 0.51–0.95)
DEPRECATED HCO3 PLAS-SCNC: 26 MMOL/L (ref 22–29)
EGFRCR SERPLBLD CKD-EPI 2021: >90 ML/MIN/1.73M2
ERYTHROCYTE [DISTWIDTH] IN BLOOD BY AUTOMATED COUNT: 12.4 % (ref 10–15)
FASTING STATUS PATIENT QL REPORTED: YES
FERRITIN SERPL-MCNC: 29 NG/ML (ref 6–175)
GLUCOSE SERPL-MCNC: 93 MG/DL (ref 70–99)
HCT VFR BLD AUTO: 34.7 % (ref 35–47)
HDLC SERPL-MCNC: 54 MG/DL
HGB BLD-MCNC: 11.3 G/DL (ref 11.7–15.7)
LDLC SERPL CALC-MCNC: 157 MG/DL
MCH RBC QN AUTO: 28.6 PG (ref 26.5–33)
MCHC RBC AUTO-ENTMCNC: 32.6 G/DL (ref 31.5–36.5)
MCV RBC AUTO: 88 FL (ref 78–100)
NONHDLC SERPL-MCNC: 170 MG/DL
PLATELET # BLD AUTO: 313 10E3/UL (ref 150–450)
POTASSIUM SERPL-SCNC: 4.1 MMOL/L (ref 3.4–5.3)
PROT SERPL-MCNC: 7.3 G/DL (ref 6.4–8.3)
RBC # BLD AUTO: 3.95 10E6/UL (ref 3.8–5.2)
SODIUM SERPL-SCNC: 138 MMOL/L (ref 135–145)
TRIGL SERPL-MCNC: 63 MG/DL
TSH SERPL DL<=0.005 MIU/L-ACNC: 3.38 UIU/ML (ref 0.3–4.2)
VIT D+METAB SERPL-MCNC: 19 NG/ML (ref 20–50)
WBC # BLD AUTO: 6.6 10E3/UL (ref 4–11)

## 2024-02-29 PROCEDURE — 91320 SARSCV2 VAC 30MCG TRS-SUC IM: CPT | Performed by: FAMILY MEDICINE

## 2024-02-29 PROCEDURE — 99396 PREV VISIT EST AGE 40-64: CPT | Mod: 25 | Performed by: FAMILY MEDICINE

## 2024-02-29 PROCEDURE — 36415 COLL VENOUS BLD VENIPUNCTURE: CPT | Performed by: FAMILY MEDICINE

## 2024-02-29 PROCEDURE — 80061 LIPID PANEL: CPT | Performed by: FAMILY MEDICINE

## 2024-02-29 PROCEDURE — 84443 ASSAY THYROID STIM HORMONE: CPT | Performed by: FAMILY MEDICINE

## 2024-02-29 PROCEDURE — 90471 IMMUNIZATION ADMIN: CPT | Performed by: FAMILY MEDICINE

## 2024-02-29 PROCEDURE — 90686 IIV4 VACC NO PRSV 0.5 ML IM: CPT | Performed by: FAMILY MEDICINE

## 2024-02-29 PROCEDURE — 85027 COMPLETE CBC AUTOMATED: CPT | Performed by: FAMILY MEDICINE

## 2024-02-29 PROCEDURE — 80053 COMPREHEN METABOLIC PANEL: CPT | Performed by: FAMILY MEDICINE

## 2024-02-29 PROCEDURE — 90472 IMMUNIZATION ADMIN EACH ADD: CPT | Performed by: FAMILY MEDICINE

## 2024-02-29 PROCEDURE — 99213 OFFICE O/P EST LOW 20 MIN: CPT | Mod: 25 | Performed by: FAMILY MEDICINE

## 2024-02-29 PROCEDURE — 82306 VITAMIN D 25 HYDROXY: CPT | Performed by: FAMILY MEDICINE

## 2024-02-29 PROCEDURE — 90480 ADMN SARSCOV2 VAC 1/ONLY CMP: CPT | Performed by: FAMILY MEDICINE

## 2024-02-29 PROCEDURE — 82728 ASSAY OF FERRITIN: CPT | Performed by: FAMILY MEDICINE

## 2024-02-29 PROCEDURE — 90746 HEPB VACCINE 3 DOSE ADULT IM: CPT | Performed by: FAMILY MEDICINE

## 2024-02-29 NOTE — PROGRESS NOTES
Preventive Care Visit  [facility]  Penelope Cortes MD, Family Medicine  Feb 29, 2024    Assessment & Plan     Routine general medical examination at a health care facility  Screening and preventative care discussed.  Immunization update today - Flu, COVID, Hep B    Vitamin D deficiency  Reassess vitamin D level - unable to find high dose without gelatin - will need to give recommendations for dosing with tablet     Acquired hypothyroidism  Assess replacement.  Refills when results return.   - TSH with free T4 reflex; Future  - TSH with free T4 reflex    Screening for diabetes mellitus  Fasting.   - Comprehensive metabolic panel (BMP + Alb, Alk Phos, ALT, AST, Total. Bili, TP); Future  - Comprehensive metabolic panel (BMP + Alb, Alk Phos, ALT, AST, Total. Bili, TP)    Encounter for screening mammogram for breast cancer  Mammogram recommended.  Due and increased risk with mother's history  - MA Screen Bilateral w/Alberto; Future    CARDIOVASCULAR SCREENING; LDL GOAL LESS THAN 160  The 10-year ASCVD risk score (Balwinder WATTS, et al., 2019) is: 0.4%    Values used to calculate the score:      Age: 43 years      Sex: Female      Is Non- : No      Diabetic: No      Tobacco smoker: No      Systolic Blood Pressure: 96 mmHg      Is BP treated: No      HDL Cholesterol: 45 mg/dL      Total Cholesterol: 186 mg/dL   Reassess risk with labs today  - Lipid panel reflex to direct LDL Fasting; Future  - Lipid panel reflex to direct LDL Fasting    History of anemia  Reassess with iron supplement.   - CBC with platelets; Future  - Ferritin; Future  - CBC with platelets  - Ferritin    Spotting between menses  Normal exam.  Patient will have  check for evidence of blood in semen.      Hair loss  Undetermined cause - thyroid function, anemia, or hereditary as possible causes.  No scalp irritation/itching.  If normal labs - she could consider use of Monoxidil shampoo.     Patient has been  advised of split billing requirements and indicates understanding: Yes  Ordering of each unique test  Prescription drug management        Counseling  Appropriate preventive services were discussed with this patient, including applicable screening as appropriate for fall prevention, nutrition, physical activity, Tobacco-use cessation, weight loss and cognition.  Checklist reviewing preventive services available has been given to the patient.  Reviewed patient's diet, addressing concerns and/or questions.   She is at risk for lack of exercise and has been provided with information to increase physical activity for the benefit of her well-being.       Patient Instructions   Lab today.  Refills will be updated when results return.    Mammogram recommended.   You can call [phone] to schedule this at the [facility] in [facility]   Chelsey is a 43 year old, presenting for the following:  Physical        2/29/2024     7:54 AM   Additional Questions   Roomed by Gale MARTIN   Accompanied by self         2/29/2024     7:54 AM   Patient Reported Additional Medications   Patient reports taking the following new medications None        Health Care Directive  Patient does not have a Health Care Directive or Living Will: Discussed advance care planning with patient; however, patient declined at this time.    Healthy Habits:     Getting at least 3 servings of Calcium per day:  Yes    Bi-annual eye exam:  NO    Dental care twice a year:  Yes    Sleep apnea or symptoms of sleep apnea:  None    Diet:  Regular (no restrictions)    Frequency of exercise:  2-3 days/week    Duration of exercise:  30-45 minutes    Taking medications regularly:  Yes    Barriers to taking medications:  Not applicable    Medication side effects:  Not applicable    Additional concerns today:  No  Walking or jogging - tolerated well.  Headache at times with exercise - back of head, mild.        Hypothyroidism Follow-up    Since last  visit, patient describes the following symptoms: Weight stable, no hair loss, no skin changes, no constipation, no loose stools  Hair loss - diffuse thinning        2/28/2024   General Health   How would you rate your overall physical health? Good   Feel stress (tense, anxious, or unable to sleep) Not at all         2/28/2024   Nutrition   Three or more servings of calcium each day? Yes   Diet: Regular (no restrictions)   How many servings of fruit and vegetables per day? (!) 2-3   How many sweetened beverages each day? 0-1         2/28/2024   Exercise   Days per week of moderate/strenous exercise 3 days   Average minutes spent exercising at this level 30 min         2/28/2024   Social Factors   Frequency of gathering with friends or relatives More than three times a week   Worry food won't last until get money to buy more No   Food not last or not have enough money for food? No   Do you have housing?  Yes   Are you worried about losing your housing? No   Lack of transportation? No   Unable to get utilities (heat,electricity)? No         2/28/2024   Dental   Dentist two times every year? Yes         2/28/2024   TB Screening   Were you born outside of [place]?  (!) YES  [place]         Today's PHQ-2 Score:       2/28/2024    10:02 AM   PHQ-2 ( 1999 [employer])   Q1: Little interest or pleasure in doing things 0   Q2: Feeling down, depressed or hopeless 0   PHQ-2 Score 0   Q1: Little interest or pleasure in doing things Not at all   Q2: Feeling down, depressed or hopeless Not at all   PHQ-2 Score 0           2/28/2024   Substance Use   Alcohol more than 3/day or more than 7/wk Not Applicable   Do you use any other substances recreationally? No     Social History     Tobacco Use    Smoking status: Never    Smokeless tobacco: Never   Vaping Use    Vaping Use: Never used   Substance Use Topics    Alcohol use: Never    Drug use: Never         3/30/2023   LAST FHS-7 RESULTS   1st degree relative breast or ovarian cancer Yes   Any  relative bilateral breast cancer No   Any male have breast cancer No   Any ONE woman have BOTH breast AND ovarian cancer No   Any woman with breast cancer before 50yrs Yes   2 or more relatives with breast AND/OR ovarian cancer Unknown   2 or more relatives with breast AND/OR bowel cancer Unknown   Mammogram Screening - Mammogram every 1-2 years updated in Health Maintenance based on mutual decision making        2024   STI Screening   New sexual partner(s) since last STI/HIV test? No     History of abnormal Pap smear: NO - age 30-65 PAP every 5 years with negative HPV co-testing recommended        Latest Ref Rng & Units 2022     7:09 AM 2017     6:27 PM 2017     6:26 PM   PAP / HPV   PAP  Negative for Intraepithelial Lesion or Malignancy (NILM)      PAP (Historical)    NIL    HPV 16 DNA Negative Negative  Negative     HPV 18 DNA Negative Negative  Negative     Other HR HPV Negative Negative  Negative       ASCVD Risk   The 10-year ASCVD risk score (Balwinder WATTS, et al., 2019) is: 0.4%    Values used to calculate the score:      Age: 43 years      Sex: Female      Is Non- : No      Diabetic: No      Tobacco smoker: No      Systolic Blood Pressure: 96 mmHg      Is BP treated: No      HDL Cholesterol: 45 mg/dL      Total Cholesterol: 186 mg/dL        2024   Contraception/Family Planning   Questions about contraception or family planning No     Reviewed and updated as needed this visit by Provider   Tobacco  Allergies  Meds   Med Hx  Surg Hx  Fam Hx            History reviewed. No pertinent past medical history.  Past Surgical History:   Procedure Laterality Date    GYN SURGERY      C section x 2    MAMMOPLASTY REDUCTION BILATERAL Bilateral 2019    Procedure: MAMMOPLASTY, REDUCTION, BILATERAL;  Surgeon: ROXANNA French MD;  Location:  OR     OB History    Para Term  AB Living   2 2 2 0 0 2   SAB IAB Ectopic Multiple Live Births   0 0  "0 0 2      # Outcome Date GA Lbr Alexandro/2nd Weight Sex Delivery Anes PTL Lv   2 Term     M CS-LTranv   HALEY   1 Term     M CS-LTranv   HALEY      Complications: Fetal Intolerance     BP Readings from Last 3 Encounters:   02/29/24 96/68   03/30/23 108/76   02/24/22 110/78    Wt Readings from Last 3 Encounters:   02/29/24 57.8 kg (127 lb 6 oz)   03/30/23 57.2 kg (126 lb)   02/24/22 59.4 kg (131 lb)                      Review of Systems  Constitutional, HEENT, cardiovascular, pulmonary, GI, , musculoskeletal, neuro, skin, endocrine and psych systems are negative, except as otherwise noted.   Scant amount of spotting after intercourse in mucus only.      Objective    Exam  BP 96/68 (BP Location: Right arm, Patient Position: Sitting, Cuff Size: Adult Regular)   Pulse 80   Temp 97.9  F (36.6  C) (Oral)   Resp 16   Ht 1.549 m (5' 1\")   Wt 57.8 kg (127 lb 6 oz)   LMP 02/20/2024   SpO2 99%   BMI 24.07 kg/m     Estimated body mass index is 24.07 kg/m  as calculated from the following:    Height as of this encounter: 1.549 m (5' 1\").    Weight as of this encounter: 57.8 kg (127 lb 6 oz).    Physical Exam  GENERAL: alert and no distress  EYES: Eyes grossly normal to inspection, PERRL and conjunctivae and sclerae normal  HENT: ear canals and TM's normal, nose and mouth without ulcers or lesions  NECK: no adenopathy, no asymmetry, masses, or scars  RESP: lungs clear to auscultation - no rales, rhonchi or wheezes  BREAST: normal without masses, tenderness or nipple discharge and no palpable axillary masses or adenopathy  CV: regular rate and rhythm, normal S1 S2, no S3 or S4, no murmur, click or rub, no peripheral edema  ABDOMEN: soft, nontender, no hepatosplenomegaly, no masses and bowel sounds normal   (female) w/bimanual: normal female external genitalia, normal urethral meatus, normal vaginal mucosa, and normal cervix/adnexa/uterus without masses or discharge. No blood in vagina no cervical or vaginal abnormality noted. "   MS: no gross musculoskeletal defects noted, no edema  SKIN: no suspicious lesions or rashes  NEURO: Normal strength and tone, mentation intact and speech normal  PSYCH: mentation appears normal, affect normal/bright      Signed Electronically by: Penelope Cortes MD        This chart was documented by provider using a voice activated software called Dragon in addition to manual typing. There may be vocabulary errors or other grammatical errors due to this.

## 2024-02-29 NOTE — PATIENT INSTRUCTIONS
Lab today.  Refills will be updated when results return.    Mammogram recommended.   You can call [phone] to schedule this at the [facility] building in [place]         Preventive Care Advice   This is general advice given by our system to help you stay healthy. However, your care team may have specific advice just for you. Please talk to your care team about your preventive care needs.  Nutrition  Eat 5 or more servings of fruits and vegetables each day.  Try wheat bread, brown rice and whole grain pasta (instead of white bread, rice, and pasta).  Get enough calcium and vitamin D. Check the label on foods and aim for 100% of the RDA (recommended daily allowance).  Lifestyle  Exercise at least 150 minutes each week   (30 minutes a day, 5 days a week).  Do muscle strengthening activities 2 days a week. These help control your weight and prevent disease.  No smoking.  Wear sunscreen to prevent skin cancer.  Have a dental exam and cleaning every 6 months.  Yearly exams  See your health care team every year to talk about:  Any changes in your health.  Any medicines your care team has prescribed.  Preventive care, family planning, and ways to prevent chronic diseases.  Shots (vaccines)   HPV shots (up to age 26), if you've never had them before.  Hepatitis B shots (up to age 59), if you've never had them before.  COVID-19 shot: Get this shot when it's due.  Flu shot: Get a flu shot every year.  Tetanus shot: Get a tetanus shot every 10 years.  Pneumococcal, hepatitis A, and RSV shots: Ask your care team if you need these based on your risk.  Shingles shot (for age 50 and up).  General health tests  Diabetes screening:  Starting at age 35, Get screened for diabetes at least every 3 years.  If you are younger than age 35, ask your care team if you should be screened for diabetes.  Cholesterol test: At age 39, start having a cholesterol test every 5 years, or more often if advised.  Bone density scan (DEXA): At age  50, ask your care team if you should have this scan for osteoporosis (brittle bones).  Hepatitis C: Get tested at least once in your life.  STIs (sexually transmitted infections)  Before age 24: Ask your care team if you should be screened for STIs.  After age 24: Get screened for STIs if you're at risk. You are at risk for STIs (including HIV) if:  You are sexually active with more than one person.  You don't use condoms every time.  You or a partner was diagnosed with a sexually transmitted infection.  If you are at risk for HIV, ask about PrEP medicine to prevent HIV.  Get tested for HIV at least once in your life, whether you are at risk for HIV or not.  Cancer screening tests  Cervical cancer screening: If you have a cervix, begin getting regular cervical cancer screening tests at age 21. Most people who have regular screenings with normal results can stop after age 65. Talk about this with your provider.  Breast cancer scan (mammogram): If you've ever had breasts, begin having regular mammograms starting at age 40. This is a scan to check for breast cancer.  Colon cancer screening: It is important to start screening for colon cancer at age 45.  Have a colonoscopy test every 10 years (or more often if you're at risk) Or, ask your provider about stool tests like a FIT test every year or Cologuard test every 3 years.  To learn more about your testing options, visit: https://[email or web address]/894367.pdf.  For help making a decision, visit: https://bit.ly/ti77178.  Prostate cancer screening test: If you have a prostate and are age 55 to 69, ask your provider if you would benefit from a yearly prostate cancer screening test.  Lung cancer screening: If you are a current or former smoker age 50 to 80, ask your care team if ongoing lung cancer screenings are right for you.  For informational purposes only. Not to replace the advice of your health care provider. Copyright   2023 [employer]. All rights  reserved. Clinically reviewed by the [facility]. [employer] 067060 - REV 01/24.

## 2024-02-29 NOTE — NURSING NOTE
Prior to immunization administration, verified patients identity using patient s name and date of birth. Please see Immunization Activity for additional information.     Screening Questionnaire for Adult Immunization    Are you sick today?   No   Do you have allergies to medications, food, a vaccine component or latex?   No   Have you ever had a serious reaction after receiving a vaccination?   No   Do you have a long-term health problem with heart, lung, kidney, or metabolic disease (e.g., diabetes), asthma, a blood disorder, no spleen, complement component deficiency, a cochlear implant, or a spinal fluid leak?  Are you on long-term aspirin therapy?   No   Do you have cancer, leukemia, HIV/AIDS, or any other immune system problem?   No   Do you have a parent, brother, or sister with an immune system problem?   No   In the past 3 months, have you taken medications that affect  your immune system, such as prednisone, other steroids, or anticancer drugs; drugs for the treatment of rheumatoid arthritis, Crohn s disease, or psoriasis; or have you had radiation treatments?   No   Have you had a seizure, or a brain or other nervous system problem?   No   During the past year, have you received a transfusion of blood or blood    products, or been given immune (gamma) globulin or antiviral drug?   No   For women: Are you pregnant or is there a chance you could become       pregnant during the next month?   No   Have you received any vaccinations in the past 4 weeks?   No     Immunization questionnaire answers were all negative.      Patient instructed to remain in clinic for 15 minutes afterwards, and to report any adverse reactions.     Screening performed by Ariana Salas MA on 2/29/2024 at 8:47 AM.

## 2024-03-04 ENCOUNTER — ANCILLARY PROCEDURE (OUTPATIENT)
Dept: MAMMOGRAPHY | Facility: CLINIC | Age: 44
End: 2024-03-04
Attending: FAMILY MEDICINE
Payer: COMMERCIAL

## 2024-03-04 DIAGNOSIS — Z12.31 ENCOUNTER FOR SCREENING MAMMOGRAM FOR BREAST CANCER: ICD-10-CM

## 2024-03-04 PROCEDURE — 77067 SCR MAMMO BI INCL CAD: CPT | Performed by: RADIOLOGY

## 2024-03-04 NOTE — RESULT ENCOUNTER NOTE
The 10-year ASCVD risk score (Balwinder WATTS, et al., 2019) is: 0.5%    Values used to calculate the score:      Age: 43 years      Sex: Female      Is Non- : No      Diabetic: No      Tobacco smoker: No      Systolic Blood Pressure: 96 mmHg      Is BP treated: No      HDL Cholesterol: 54 mg/dL      Total Cholesterol: 224 mg/dL   Your cholesterol levels are higher than previous.  When considering these results and other risk factor assessment, your overall risk for heart disease is in a range that is best managed with healthy diet and exercise.  Your vitamin D level is very low.  I would recommend an increase in vitamin D supplement to 5000 international unit(s) daily.  Recheck is recommended in 6 months on this dosage.   You are borderline anemic and iron stores remain normal but are lower in the range.  Continuing iron supplement is recommended.  Your blood sugar is normal.  Your liver and kidney function is normal.  Your thyroid testing is normal indicating you are on the correct dosage of thyroid medication.  Please call or MyChart message me if you have any questions.      JEREMY

## 2024-04-25 DIAGNOSIS — E03.9 ACQUIRED HYPOTHYROIDISM: ICD-10-CM

## 2024-04-25 RX ORDER — LEVOTHYROXINE SODIUM 100 UG/1
100 TABLET ORAL DAILY
Qty: 30 TABLET | Refills: 8 | Status: SHIPPED | OUTPATIENT
Start: 2024-04-25

## 2025-01-29 ENCOUNTER — PATIENT OUTREACH (OUTPATIENT)
Dept: CARE COORDINATION | Facility: CLINIC | Age: 45
End: 2025-01-29
Payer: COMMERCIAL

## 2025-02-12 ENCOUNTER — PATIENT OUTREACH (OUTPATIENT)
Dept: CARE COORDINATION | Facility: CLINIC | Age: 45
End: 2025-02-12
Payer: COMMERCIAL

## 2025-02-13 ENCOUNTER — MYC MEDICAL ADVICE (OUTPATIENT)
Dept: FAMILY MEDICINE | Facility: CLINIC | Age: 45
End: 2025-02-13
Payer: COMMERCIAL

## 2025-02-13 NOTE — TELEPHONE ENCOUNTER
Ok to offer same day slot near the March 3 date she was cancelled from.  I am not sure why the April appointment was cancelled.  She can have appointment that day if desired.    JEREMY

## 2025-03-13 ENCOUNTER — OFFICE VISIT (OUTPATIENT)
Dept: FAMILY MEDICINE | Facility: CLINIC | Age: 45
End: 2025-03-13
Payer: COMMERCIAL

## 2025-03-13 VITALS
BODY MASS INDEX: 24.05 KG/M2 | RESPIRATION RATE: 16 BRPM | DIASTOLIC BLOOD PRESSURE: 67 MMHG | HEART RATE: 74 BPM | TEMPERATURE: 98.1 F | SYSTOLIC BLOOD PRESSURE: 95 MMHG | OXYGEN SATURATION: 100 % | HEIGHT: 61 IN | WEIGHT: 127.4 LBS

## 2025-03-13 DIAGNOSIS — E55.9 VITAMIN D DEFICIENCY: ICD-10-CM

## 2025-03-13 DIAGNOSIS — Z13.0 SCREENING, ANEMIA, DEFICIENCY, IRON: ICD-10-CM

## 2025-03-13 DIAGNOSIS — Z12.31 VISIT FOR SCREENING MAMMOGRAM: ICD-10-CM

## 2025-03-13 DIAGNOSIS — E03.9 ACQUIRED HYPOTHYROIDISM: ICD-10-CM

## 2025-03-13 DIAGNOSIS — Z13.220 LIPID SCREENING: ICD-10-CM

## 2025-03-13 DIAGNOSIS — Z13.1 SCREENING FOR DIABETES MELLITUS: ICD-10-CM

## 2025-03-13 DIAGNOSIS — Z00.00 ROUTINE GENERAL MEDICAL EXAMINATION AT A HEALTH CARE FACILITY: Primary | ICD-10-CM

## 2025-03-13 LAB
ERYTHROCYTE [DISTWIDTH] IN BLOOD BY AUTOMATED COUNT: 12.6 % (ref 10–15)
HCT VFR BLD AUTO: 33.8 % (ref 35–47)
HGB BLD-MCNC: 11.2 G/DL (ref 11.7–15.7)
MCH RBC QN AUTO: 29.4 PG (ref 26.5–33)
MCHC RBC AUTO-ENTMCNC: 33.1 G/DL (ref 31.5–36.5)
MCV RBC AUTO: 89 FL (ref 78–100)
PLATELET # BLD AUTO: 299 10E3/UL (ref 150–450)
RBC # BLD AUTO: 3.81 10E6/UL (ref 3.8–5.2)
WBC # BLD AUTO: 5.7 10E3/UL (ref 4–11)

## 2025-03-13 SDOH — HEALTH STABILITY: PHYSICAL HEALTH: ON AVERAGE, HOW MANY MINUTES DO YOU ENGAGE IN EXERCISE AT THIS LEVEL?: 20 MIN

## 2025-03-13 SDOH — HEALTH STABILITY: PHYSICAL HEALTH: ON AVERAGE, HOW MANY DAYS PER WEEK DO YOU ENGAGE IN MODERATE TO STRENUOUS EXERCISE (LIKE A BRISK WALK)?: 2 DAYS

## 2025-03-13 ASSESSMENT — SOCIAL DETERMINANTS OF HEALTH (SDOH)
HOW OFTEN DO YOU GET TOGETHER WITH FRIENDS OR RELATIVES?: MORE THAN THREE TIMES A WEEK
HOW OFTEN DO YOU GET TOGETHER WITH FRIENDS OR RELATIVES?: MORE THAN THREE TIMES A WEEK

## 2025-03-13 NOTE — PROGRESS NOTES
Preventive Care Visit  [facility]  Penelope Cortes MD, Family Medicine  Mar 13, 2025      Assessment & Plan     Routine general medical examination at a health care facility  Screening and preventive care discussed.  Previous history of left abdominal/adnexal pain.  This is currently resolved.  Discussed things to look for if symptoms should recur.  She will follow-up if persistent symptoms are noted.    Acquired hypothyroidism  Assess thyroid replacement.  Refill will be sent when results return.  - TSH WITH FREE T4 REFLEX; Future    Vitamin D deficiency  Has been taking vitamin D until recently.  Reassess vitamin D level.    - Vitamin D Deficiency; Future    Screening for diabetes mellitus  Nonfasting lab  - Comprehensive metabolic panel (BMP + Alb, Alk Phos, ALT, AST, Total. Bili, TP); Future    Screening, anemia, deficiency, iron  Continues with iron supplementation.  Reassess blood count  - CBC with platelets; Future  - Ferritin; Future    Lipid screening  Nonfasting lipids today  - Lipid panel reflex to direct LDL Non-fasting; Future    Visit for screening mammogram  Mammogram recommended due to family history  - MA Screen Bilateral w/Alberto; Future    Patient has been advised of split billing requirements and indicates understanding: Yes        Counseling  Appropriate preventive services were addressed with this patient via screening, questionnaire, or discussion as appropriate for fall prevention, nutrition, physical activity, Tobacco-use cessation, social engagement, weight loss and cognition.  Checklist reviewing preventive services available has been given to the patient.  Reviewed patient's diet, addressing concerns and/or questions.   She is at risk for lack of exercise and has been provided with information to increase physical activity for the benefit of her well-being.       Patient Instructions   Labs today.  Mammogram recommended.  Please call [facility] to set up this  appointment     For the left lower abdominal pain, exam is normal today.  If recurs think about whether you are constipated, 2 weeks after menstrual period or have lifted something prior to symptoms.  If persistent symptoms are noted, follow up is recommended.          Janiya Barbosa is a 44 year old, presenting for the following:  Physical        3/13/2025     8:32 AM   Additional Questions   Roomed by Ariana WALSH       Hypothyroidism Follow-up    Since last visit, patient describes the following symptoms: Weight stable, no hair loss, no skin changes, no constipation, no loose stools    Advance Care Planning  Patient does not have a Health Care Directive: Discussed advance care planning with patient; information given to patient to review.      3/13/2025   General Health   How would you rate your overall physical health? Good   Feel stress (tense, anxious, or unable to sleep) Only a little   (!) STRESS CONCERN      3/13/2025   Nutrition   Three or more servings of calcium each day? Yes   Diet: Regular (no restrictions)   How many servings of fruit and vegetables per day? (!) 0-1   How many sweetened beverages each day? 0-1         3/13/2025   Exercise   Days per week of moderate/strenous exercise 2 days   Average minutes spent exercising at this level 20 min   (!) EXERCISE CONCERN  -  walking.  Tolerated well       3/13/2025   Social Factors   Frequency of gathering with friends or relatives More than three times a week   Worry food won't last until get money to buy more No   Food not last or not have enough money for food? No   Do you have housing? (Housing is defined as stable permanent housing and does not include staying ouside in a car, in a tent, in an abandoned building, in an overnight shelter, or couch-surfing.) Yes   Are you worried about losing your housing? No   Lack of transportation? No   Unable to get utilities (heat,electricity)? No         3/13/2025   Dental   Dentist two times every  year? Yes           2/28/2024   TB Screening   Were you born outside of the [place]? (!) YES  [place]           Today's PHQ-2 Score:       3/13/2025     8:20 AM   PHQ-2 ( 1999 [employer])   Q1: Little interest or pleasure in doing things 0   Q2: Feeling down, depressed or hopeless 0   PHQ-2 Score 0    Q1: Little interest or pleasure in doing things Not at all   Q2: Feeling down, depressed or hopeless Not at all   PHQ-2 Score 0       Patient-reported           3/13/2025   Substance Use   Alcohol more than 3/day or more than 7/wk Not Applicable   Do you use any other substances recreationally? No     Social History     Tobacco Use    Smoking status: Never    Smokeless tobacco: Never   Vaping Use    Vaping status: Never Used   Substance Use Topics    Alcohol use: Never    Drug use: Never           3/4/2024   LAST FHS-7 RESULTS   1st degree relative breast or ovarian cancer Yes   Any relative bilateral breast cancer No   Any male have breast cancer No   Any ONE woman have BOTH breast AND ovarian cancer No   Any woman with breast cancer before 50yrs No   2 or more relatives with breast AND/OR ovarian cancer No   2 or more relatives with breast AND/OR bowel cancer No        Mammogram Screening - Mammogram every 1-2 years updated in Health Maintenance based on mutual decision making        3/13/2025   STI Screening   New sexual partner(s) since last STI/HIV test? No     History of abnormal Pap smear: No - age 30- 64 PAP with HPV every 5 years recommended        Latest Ref Rng & Units 2/24/2022     7:09 AM 9/6/2017     6:27 PM 9/6/2017     6:26 PM   PAP / HPV   PAP  Negative for Intraepithelial Lesion or Malignancy (NILM)      PAP (Historical)    NIL    HPV 16 DNA Negative Negative  Negative     HPV 18 DNA Negative Negative  Negative     Other HR HPV Negative Negative  Negative       ASCVD Risk   The ASCVD Risk score (Balwinder WATTS, et al., 2019) failed to calculate for the following reasons:    The systolic blood pressure is  "missing        3/13/2025   Contraception/Family Planning   Questions about contraception or family planning No        Reviewed and updated as needed this visit by Provider   Tobacco  Allergies  Meds   Med Hx  Surg Hx  Fam Hx            History reviewed. No pertinent past medical history.  Past Surgical History:   Procedure Laterality Date    GYN SURGERY      C section x 2    MAMMOPLASTY REDUCTION BILATERAL Bilateral 2019    Procedure: MAMMOPLASTY, REDUCTION, BILATERAL;  Surgeon: ROXANNA French MD;  Location: [facility] OR     OB History    Para Term  AB Living   2 2 2 0 0 2   SAB IAB Ectopic Multiple Live Births   0 0 0 0 2      # Outcome Date GA Lbr Alexandro/2nd Weight Sex Type Anes PTL Lv   2 Term     M CS-LTranv   HALEY   1 Term     M CS-LTranv   HALEY      Complications: Fetal Intolerance     BP Readings from Last 3 Encounters:   25 95/67   24 96/68   23 108/76    Wt Readings from Last 3 Encounters:   25 57.8 kg (127 lb 6.4 oz)   24 57.8 kg (127 lb 6 oz)   23 57.2 kg (126 lb)                      Review of Systems  Constitutional, HEENT, cardiovascular, pulmonary, GI, , musculoskeletal, neuro, skin, endocrine and psych systems are negative, except as otherwise noted.   Intermittent pulling sensation in the lower abdomen bilateral - not recently  Constipation - intermittently.  Less fiber in diet - better with increase in raw veges.        Objective    Exam  BP 95/67 (BP Location: Right arm, Patient Position: Sitting, Cuff Size: Adult Regular)   Pulse 74   Temp 98.1  F (36.7  C) (Oral)   Resp 16   Ht 1.549 m (5' 1\")   Wt 57.8 kg (127 lb 6.4 oz)   LMP 2025 (Exact Date)   SpO2 100%   BMI 24.07 kg/m     Estimated body mass index is 24.07 kg/m  as calculated from the following:    Height as of this encounter: 1.549 m (5' 1\").    Weight as of this encounter: 57.8 kg (127 lb 6.4 oz).    Physical Exam  GENERAL: alert and no distress  EYES: Eyes grossly " normal to inspection, PERRL and conjunctivae and sclerae normal  HENT: ear canals and TM's normal, nose and mouth without ulcers or lesions  NECK: no adenopathy, no asymmetry, masses, or scars  RESP: lungs clear to auscultation - no rales, rhonchi or wheezes  BREAST: normal without masses, tenderness or nipple discharge and no palpable axillary masses or adenopathy  CV: regular rate and rhythm, normal S1 S2, no S3 or S4, no murmur, click or rub, no peripheral edema  ABDOMEN: soft, nontender, no hepatosplenomegaly, no masses and bowel sounds normal   (female) w/bimanual: normal female external genitalia, normal urethral meatus, normal vaginal mucosa, and normal cervix/adnexa/uterus without masses or discharge  MS: no gross musculoskeletal defects noted, no edema  SKIN: no suspicious lesions or rashes  NEURO: Normal strength and tone, mentation intact and speech normal  PSYCH: mentation appears normal, affect normal/bright        Signed Electronically by: Penelope Cortes MD          This chart was documented by provider using a voice activated software called Dragon in addition to manual typing. There may be vocabulary errors or other grammatical errors due to this.

## 2025-03-13 NOTE — PATIENT INSTRUCTIONS
Labs today.  Mammogram recommended.  Please call [address] [place] [address] to set up this appointment     For the left lower abdominal pain, exam is normal today.  If recurs think about whether you are constipated, 2 weeks after menstrual period or have lifted something prior to symptoms.  If persistent symptoms are noted, follow up is recommended.        Patient Education   Preventive Care Advice   This is general advice given by our system to help you stay healthy. However, your care team may have specific advice just for you. Please talk to your care team about your preventive care needs.  Nutrition  Eat 5 or more servings of fruits and vegetables each day.  Try wheat bread, brown rice and whole grain pasta (instead of white bread, rice, and pasta).  Get enough calcium and vitamin D. Check the label on foods and aim for 100% of the RDA (recommended daily allowance).  Lifestyle  Exercise at least 150 minutes each week  (30 minutes a day, 5 days a week).  Do muscle strengthening activities 2 days a week. These help control your weight and prevent disease.  No smoking.  Wear sunscreen to prevent skin cancer.  Have a dental exam and cleaning every 6 months.  Yearly exams  See your health care team every year to talk about:  Any changes in your health.  Any medicines your care team has prescribed.  Preventive care, family planning, and ways to prevent chronic diseases.  Shots (vaccines)   HPV shots (up to age 26), if you've never had them before.  Hepatitis B shots (up to age 59), if you've never had them before.  COVID-19 shot: Get this shot when it's due.  Flu shot: Get a flu shot every year.  Tetanus shot: Get a tetanus shot every 10 years.  Pneumococcal, hepatitis A, and RSV shots: Ask your care team if you need these based on your risk.  Shingles shot (for age 50 and up)  General health tests  Diabetes screening:  Starting at age 35, Get screened for diabetes at least every 3 years.  If you are younger than age 35, ask your  care team if you should be screened for diabetes.  Cholesterol test: At age 39, start having a cholesterol test every 5 years, or more often if advised.  Bone density scan (DEXA): At age 50, ask your care team if you should have this scan for osteoporosis (brittle bones).  Hepatitis C: Get tested at least once in your life.  STIs (sexually transmitted infections)  Before age 24: Ask your care team if you should be screened for STIs.  After age 24: Get screened for STIs if you're at risk. You are at risk for STIs (including HIV) if:  You are sexually active with more than one person.  You don't use condoms every time.  You or a partner was diagnosed with a sexually transmitted infection.  If you are at risk for HIV, ask about PrEP medicine to prevent HIV.  Get tested for HIV at least once in your life, whether you are at risk for HIV or not.  Cancer screening tests  Cervical cancer screening: If you have a cervix, begin getting regular cervical cancer screening tests starting at age 21.  Breast cancer scan (mammogram): If you've ever had breasts, begin having regular mammograms starting at age 40. This is a scan to check for breast cancer.  Colon cancer screening: It is important to start screening for colon cancer at age 45.  Have a colonoscopy test every 10 years (or more often if you're at risk) Or, ask your provider about stool tests like a FIT test every year or Cologuard test every 3 years.  To learn more about your testing options, visit:   .  For help making a decision, visit:   https://bit.ly/ro45982.  Prostate cancer screening test: If you have a prostate, ask your care team if a prostate cancer screening test (PSA) at age 55 is right for you.  Lung cancer screening: If you are a current or former smoker ages 50 to 80, ask your care team if ongoing lung cancer screenings are right for you.  For informational purposes only. Not to replace the advice of your health care provider. Copyright   2023 [facility]. All rights reserved. Clinically reviewed by the [facility]. [employer] AB 172617 - REV 01/24.

## 2025-03-15 LAB — T4 FREE SERPL-MCNC: 1.58 NG/DL (ref 0.9–1.7)

## 2025-03-16 DIAGNOSIS — E03.9 ACQUIRED HYPOTHYROIDISM: Primary | ICD-10-CM

## 2025-03-17 ENCOUNTER — MYC MEDICAL ADVICE (OUTPATIENT)
Dept: FAMILY MEDICINE | Facility: CLINIC | Age: 45
End: 2025-03-17
Payer: COMMERCIAL

## 2025-03-17 LAB
ALBUMIN SERPL BCG-MCNC: 4.1 G/DL (ref 3.5–5.2)
ALP SERPL-CCNC: 68 U/L (ref 40–150)
ALT SERPL W P-5'-P-CCNC: 13 U/L (ref 0–50)
ANION GAP SERPL CALCULATED.3IONS-SCNC: 13 MMOL/L (ref 7–15)
AST SERPL W P-5'-P-CCNC: 17 U/L (ref 0–45)
BILIRUB SERPL-MCNC: 0.4 MG/DL
BUN SERPL-MCNC: 13.9 MG/DL (ref 6–20)
CALCIUM SERPL-MCNC: 9.6 MG/DL (ref 8.8–10.4)
CHLORIDE SERPL-SCNC: 106 MMOL/L (ref 98–107)
CREAT SERPL-MCNC: 0.77 MG/DL (ref 0.51–0.95)
EGFRCR SERPLBLD CKD-EPI 2021: >90 ML/MIN/1.73M2
FASTING STATUS PATIENT QL REPORTED: YES
GLUCOSE SERPL-MCNC: 83 MG/DL (ref 70–99)
HCO3 SERPL-SCNC: 22 MMOL/L (ref 22–29)
POTASSIUM SERPL-SCNC: 4.3 MMOL/L (ref 3.4–5.3)
PROT SERPL-MCNC: 6.8 G/DL (ref 6.4–8.3)
SODIUM SERPL-SCNC: 141 MMOL/L (ref 135–145)

## 2025-03-18 NOTE — TELEPHONE ENCOUNTER
Patient had elevated TSH on 3/13/25 as shown below. Please see MyChart message and advise.       RJ Elizondo  [facility]

## 2025-04-01 ENCOUNTER — MYC MEDICAL ADVICE (OUTPATIENT)
Dept: FAMILY MEDICINE | Facility: CLINIC | Age: 45
End: 2025-04-01
Payer: COMMERCIAL

## 2025-04-01 NOTE — TELEPHONE ENCOUNTER
Routing to provider to review and advise.     Radha Buenrostro, ALAYNAN, RN   [facility]

## 2025-05-11 ENCOUNTER — HEALTH MAINTENANCE LETTER (OUTPATIENT)
Age: 45
End: 2025-05-11

## 2025-08-31 ENCOUNTER — PATIENT OUTREACH (OUTPATIENT)
Dept: CARE COORDINATION | Facility: CLINIC | Age: 45
End: 2025-08-31
Payer: COMMERCIAL

## (undated) DEVICE — STPL SKIN 35W 054887

## (undated) DEVICE — PACK MINOR SBA15MIFSE

## (undated) DEVICE — SYR 10ML LL W/O NDL

## (undated) DEVICE — DRAPE SPLIT EENT 76X124" 3X28" 9447

## (undated) DEVICE — SYR BULB IRRIG DOVER 60 ML LATEX FREE 67000

## (undated) DEVICE — SU DERMABOND PRINEO CLR602US

## (undated) DEVICE — TUBING SUCTION 10'X3/16" N510

## (undated) DEVICE — SU DERMABOND PRINEO 22CM CLR222US

## (undated) DEVICE — CONNECTOR Y 3/8" 369

## (undated) DEVICE — SU MONOCRYL 2-0 SH 27" UND Y417H

## (undated) DEVICE — SPONGE LAP 18X18" 1515

## (undated) DEVICE — PREP CHLORAPREP 26ML TINTED ORANGE  260815

## (undated) DEVICE — SU PLAIN FAST ABSORB 5-0 PC-1 18" 1915G

## (undated) DEVICE — SU VICRYL 2-0 TIE 12X18" J905T

## (undated) DEVICE — DECANTER TRANSFER DEVICE 2008S

## (undated) DEVICE — DRAPE SHEET HALF 40X60" 9358

## (undated) DEVICE — BLADE KNIFE SURG 10 371110

## (undated) DEVICE — TUBE SMOKE EVAC 7/8"X10 (STERILE)

## (undated) DEVICE — ESU ELEC BLADE HEX-LOCKING 2.5" E1450X

## (undated) DEVICE — ESU PENCIL W/SMOKE EVAC E2515HS

## (undated) DEVICE — Device

## (undated) DEVICE — DRAPE IOBAN INCISE 23X17" 6650EZ

## (undated) DEVICE — NDL SPINAL 25GA 3.5" QUINCKE 405180

## (undated) DEVICE — GLOVE PROTEXIS W/NEU-THERA 7.0  2D73TE70

## (undated) DEVICE — DRSG KERLIX 4 1/2"X4YDS ROLL 6715

## (undated) DEVICE — SU MONOCRYL 3-0 PS-2 27" Y427H

## (undated) DEVICE — SOL WATER IRRIG 1000ML BOTTLE 07139-09

## (undated) RX ORDER — PROPOFOL 10 MG/ML
INJECTION, EMULSION INTRAVENOUS
Status: DISPENSED
Start: 2019-06-07

## (undated) RX ORDER — ONDANSETRON 2 MG/ML
INJECTION INTRAMUSCULAR; INTRAVENOUS
Status: DISPENSED
Start: 2019-06-07

## (undated) RX ORDER — FENTANYL CITRATE 50 UG/ML
INJECTION, SOLUTION INTRAMUSCULAR; INTRAVENOUS
Status: DISPENSED
Start: 2019-06-07

## (undated) RX ORDER — GABAPENTIN 300 MG/1
CAPSULE ORAL
Status: DISPENSED
Start: 2019-06-07

## (undated) RX ORDER — OXYCODONE HYDROCHLORIDE 5 MG/1
TABLET ORAL
Status: DISPENSED
Start: 2019-06-07

## (undated) RX ORDER — CEFAZOLIN SODIUM 1 G/3ML
INJECTION, POWDER, FOR SOLUTION INTRAMUSCULAR; INTRAVENOUS
Status: DISPENSED
Start: 2019-06-07

## (undated) RX ORDER — SCOLOPAMINE TRANSDERMAL SYSTEM 1 MG/1
PATCH, EXTENDED RELEASE TRANSDERMAL
Status: DISPENSED
Start: 2019-06-07

## (undated) RX ORDER — BUPIVACAINE HYDROCHLORIDE 2.5 MG/ML
INJECTION, SOLUTION INFILTRATION; PERINEURAL
Status: DISPENSED
Start: 2019-06-07

## (undated) RX ORDER — ACETAMINOPHEN 325 MG/1
TABLET ORAL
Status: DISPENSED
Start: 2019-06-07

## (undated) RX ORDER — LIDOCAINE HYDROCHLORIDE 20 MG/ML
INJECTION, SOLUTION EPIDURAL; INFILTRATION; INTRACAUDAL; PERINEURAL
Status: DISPENSED
Start: 2019-06-07